# Patient Record
Sex: FEMALE | Race: WHITE | NOT HISPANIC OR LATINO | URBAN - NONMETROPOLITAN AREA
[De-identification: names, ages, dates, MRNs, and addresses within clinical notes are randomized per-mention and may not be internally consistent; named-entity substitution may affect disease eponyms.]

---

## 2018-07-03 ENCOUNTER — OFFICE VISIT CONVERTED (OUTPATIENT)
Dept: FAMILY MEDICINE CLINIC | Age: 18
End: 2018-07-03
Attending: NURSE PRACTITIONER

## 2018-07-07 ENCOUNTER — OFFICE VISIT CONVERTED (OUTPATIENT)
Dept: FAMILY MEDICINE CLINIC | Age: 18
End: 2018-07-07
Attending: NURSE PRACTITIONER

## 2019-06-05 ENCOUNTER — HOSPITAL ENCOUNTER (OUTPATIENT)
Dept: OTHER | Facility: HOSPITAL | Age: 19
Discharge: HOME | End: 2019-06-05
Attending: NURSE PRACTITIONER

## 2019-06-05 ENCOUNTER — OFFICE VISIT CONVERTED (OUTPATIENT)
Dept: FAMILY MEDICINE CLINIC | Age: 19
End: 2019-06-05
Attending: NURSE PRACTITIONER

## 2019-06-06 LAB
HSV1 IGG SER IA-ACNC: <0.91 INDEX (ref 0–0.9)
HSV2 IGG SER IA-ACNC: <0.91 INDEX (ref 0–0.9)

## 2019-07-05 ENCOUNTER — HOSPITAL ENCOUNTER (OUTPATIENT)
Dept: OTHER | Facility: HOSPITAL | Age: 19
Discharge: HOME | End: 2019-07-05

## 2019-07-05 LAB
ALBUMIN SERPL-MCNC: 4.5 G/DL (ref 3.5–5)
ALP SERPL-CCNC: 62 U/L (ref 42–98)
ALT SERPL-CCNC: 8 U/L (ref 10–40)
AST SERPL-CCNC: 16 U/L (ref 15–50)
BILIRUB SERPL-MCNC: 0.33 MG/DL (ref 0.2–1.3)
CONV BILI, CONJUGATED: <0.2 MG/DL (ref 0–0.6)
CONV TOTAL PROTEIN: 7.2 G/DL (ref 6.3–8.2)
CONV UNCONJUGATED BILIRUBIN: 0.1 MG/DL (ref 0–1.1)

## 2019-07-10 LAB
CONV QUANTIFERON TB GOLD: NEGATIVE
QUANTIFERON CRITERIA: NORMAL
QUANTIFERON MITOGEN VALUE: >10 IU/ML
QUANTIFERON NIL VALUE: 0.09 IU/ML
QUANTIFERON TB1 AG VALUE: 0.14 IU/ML
QUANTIFERON TB2 AG VALUE: 0.08 IU/ML

## 2019-07-22 ENCOUNTER — OFFICE VISIT CONVERTED (OUTPATIENT)
Dept: FAMILY MEDICINE CLINIC | Age: 19
End: 2019-07-22
Attending: NURSE PRACTITIONER

## 2019-07-22 ENCOUNTER — HOSPITAL ENCOUNTER (OUTPATIENT)
Dept: OTHER | Facility: HOSPITAL | Age: 19
Discharge: HOME | End: 2019-07-22

## 2019-07-22 LAB
C TRACH RRNA CVX QL NAA+PROBE: NOT DETECTED
CONV HIV-1/ HIV-2: NEGATIVE
N GONORRHOEA DNA SPEC QL NAA+PROBE: NOT DETECTED
TRICHOMONAS, POC: NORMAL

## 2019-07-24 LAB
BACTERIA SPEC AEROBE CULT: NORMAL
CONV HEPATITIS B SURFACE AG W CONFIRMATION RE: NEGATIVE
HAV IGM SERPL QL IA: NEGATIVE
HBV CORE IGM SERPL QL IA: NEGATIVE
HCV AB SER DONR QL: <0.1 S/CO RATIO (ref 0–0.9)
HSV1 IGG SER IA-ACNC: <0.91 INDEX (ref 0–0.9)
HSV2 IGG SER IA-ACNC: <0.91 INDEX (ref 0–0.9)

## 2019-07-25 LAB — CONV TREPONEMA PALLIDUM (RPR) WITH FTA-ABS, TP-PA REFLEXES: NON REACTIVE

## 2019-07-30 ENCOUNTER — HOSPITAL ENCOUNTER (OUTPATIENT)
Dept: OTHER | Facility: HOSPITAL | Age: 19
Discharge: HOME | End: 2019-07-30

## 2019-07-30 ENCOUNTER — OFFICE VISIT CONVERTED (OUTPATIENT)
Dept: FAMILY MEDICINE CLINIC | Age: 19
End: 2019-07-30
Attending: NURSE PRACTITIONER

## 2019-08-01 ENCOUNTER — OFFICE VISIT CONVERTED (OUTPATIENT)
Dept: FAMILY MEDICINE CLINIC | Age: 19
End: 2019-08-01
Attending: NURSE PRACTITIONER

## 2019-08-01 ENCOUNTER — HOSPITAL ENCOUNTER (OUTPATIENT)
Dept: OTHER | Facility: HOSPITAL | Age: 19
Discharge: HOME | End: 2019-08-01

## 2019-08-01 LAB — BACTERIA SPEC AEROBE CULT: NORMAL

## 2019-08-06 LAB
CONV LAST MENSTURAL PERIOD: 2014
SPECIMEN SOURCE: NORMAL
SPECIMEN SOURCE: NORMAL
THIN PREP CVX: NORMAL

## 2019-08-15 ENCOUNTER — HOSPITAL ENCOUNTER (OUTPATIENT)
Dept: OTHER | Facility: HOSPITAL | Age: 19
Discharge: HOME | End: 2019-08-15

## 2019-08-15 LAB
FSH SERPL-ACNC: 5.7 M[IU]/ML
LH SERPL-ACNC: 11.7 M[IU]/ML

## 2019-08-16 LAB
DHEA-S SERPL-MCNC: 200.7 UG/DL (ref 110–431.7)
PROGEST SERPL-MCNC: 0.4 NG/ML

## 2019-08-17 LAB — CONV ESTROGENS, TOTAL, SERUM: 103 PG/ML

## 2019-08-22 LAB
CONV TESTOSTERONE, FREE: 2.3 PG/ML
TESTOST FREE MFR SERPL: 0.6 %
TESTOST SERPL-MCNC: 38 NG/DL

## 2021-01-07 ENCOUNTER — OFFICE VISIT CONVERTED (OUTPATIENT)
Dept: FAMILY MEDICINE CLINIC | Age: 21
End: 2021-01-07
Attending: NURSE PRACTITIONER

## 2021-04-30 ENCOUNTER — OFFICE VISIT CONVERTED (OUTPATIENT)
Dept: FAMILY MEDICINE CLINIC | Age: 21
End: 2021-04-30
Attending: NURSE PRACTITIONER

## 2021-05-18 NOTE — PROGRESS NOTES
Brittany Vasquez  2000     Office/Outpatient Visit    Visit Date: Fri, Apr 30, 2021 09:17 am    Provider: Kelly Underwood N.P. (Assistant: Sabrina Riojas MA)    Location: [facility]        Electronically signed by Kelly Underwood N.P. on  04/30/2021 10:18:41 AM                             Subjective:        CC: Ms. Vasquez is a 21 year old White female.  PT is present in the office today due to due to anxiety: trouble falling asleep/staying asleep, heavy weight on chest, severe questioning everything, contant worrying, irritable. PT also wants to discuss exessive hair growth.;         HPI:           PHQ-9 Depression Screening: Completed form scanned and in chart; Total Score 11           Dysthymic disorder details; she has suggestive symptoms but does not currently carry an official diagnosis of anxiety disorder.  Her symptom complex includes apprehension, feeling of impending doom, increased perspiration, mind racing, difficulty sleeping, irritability, and feeling down.  Apparent triggers include will be graduating school soon, relationship issues, and covid pandemic.  She is not currently being treated for anxiety.      ROS:     CONSTITUTIONAL:  Negative for chills and fever.      CARDIOVASCULAR:  Negative for chest pain and palpitations.      RESPIRATORY:  Negative for dyspnea and frequent wheezing.      NEUROLOGICAL:  Negative for dizziness and headaches.      ENDOCRINE:  Positive for hirsutism and excessive sweating.      PSYCHIATRIC:  Positive for anxiety and sleep disturbance.   Negative for suicidal thoughts.          Past Medical History / Family History / Social History:         Last Reviewed on 4/30/2021 09:33 AM by Kelly Underwood    Past Medical History:                 PAST MEDICAL HISTORY         scoliosis/went to [facility] - no longer seeing         GYNECOLOGICAL HISTORY:    G0    Menarche occurred at age 10.          Surgical History:         Positive  for bunionectomy x 2 , and ;         Family History:     Father: Healthy     Mother: Hypertension; Hyperlipidemia;  Skin Cancer     Paternal Grandfather:  at age 55; Cause of death was lung related     Maternal Grandfather:  at age 75; Cause of death was MI/CVA;  Heart disease;  Skin Cancer     Maternal Grandmother: Arrhythmia ( pacemaker ); Coronary Artery Disease;  Skin Cancer;  cerebral aneurysm         Social History:     Occupation: Student. [employer];     Marital Status:      Children: None         Tobacco/Alcohol/Supplements:     Last Reviewed on 2021 09:22 AM by Sabrina Riojas    Tobacco: She has never smoked.          Alcohol: Frequency: Socially         Substance Abuse History:     Last Reviewed on 2019 03:28 PM by Cindy De Paz         Mental Health History:     Last Reviewed on 2019 03:28 PM by Cindy De Paz        Communicable Diseases (eg STDs):     Last Reviewed on 2019 03:28 PM by Cindy De Paz        Current Problems:     Last Reviewed on 2019 03:28 PM by Cindy De Paz    Chondromalacia patellae    Acquired hallux valgus    Exercise induced bronchospasm    Acne    Scoliosis    Encounter for screening for respiratory tuberculosis    High risk heterosexual behavior    Nonspecific reaction to tuberculin skin test without active tuberculosis    Hypertrichosis, unspecified    Left lower quadrant pain    Encounter for screening for depression        Immunizations:     influenza, injectable, quadrivalent 10/29/2020    DTaP 1998    DTaP 1998    DTaP 1999    DTaP 1998    DTaP 2002    Hib HbOC (4-dose) 1998    Hib HbOC (4-dose) 1999    Comvax (Hib-HepB) 1998    Comvax (Hib-HepB) 1998    Hep B (pedi/adol, 3-dose schedule) 1998    Menveo 4/15/2014    Menactra 2009    zzGardasil 2009    zzGardasil 2009    zzGardasil 2010    Hep A, adult dose 5/3/2018     OPV  Poliovirus, live (oral) 1998    OPV  Poliovirus, live (oral) 1998    OPV  Poliovirus, live (oral) 1/28/2002    IPV  Poliovirus, inactivated 1998    MMR  (Measles-Mumps-Rubella), live 2/8/2002    MMRV 3/8/1999    Varicella, live 3/8/1999    Varivax (Varicella) 6/20/2013    Prevnar (Pneumococcal PCV 7) 4/3/2000    Influenza Virus Vaccine, unspecified formulation 10/1/2018    PPD 7/3/2018    PPD 7/6/2018    Adacel (Tdap) 7/3/2018    Tdap (Tetanus, reduced diph, acellular pertussis) 7/14/2009    SARS-COV-2 (COVID-19) vaccine, UNSPECIFIED 4/8/2021        Allergies:     Last Reviewed on 4/30/2021 09:21 AM by Sabrina Riojas    No Known Allergies.        Current Medications:     Last Reviewed on 4/30/2021 09:21 AM by Sabrina Riojas    None        Objective:        Vitals:         Current: 4/30/2021 9:27:59 AM    Ht:  5 ft, 4.5 in;  Wt: 126.4 lbs;  BMI: 21.4T: 97.8 F (temporal);  BP: 116/79 mm Hg (left arm, sitting);  P: 77 bpm (left arm (BP Cuff), sitting)        Exams:     PHYSICAL EXAM:     GENERAL: well developed, well nourished;  no apparent distress;     RESPIRATORY: normal respiratory rate and pattern with no distress; normal breath sounds with no rales, rhonchi, wheezes or rubs;     CARDIOVASCULAR: normal rate; rhythm is regular;     NEUROLOGIC: mental status: alert and oriented x 3; GROSSLY INTACT     PSYCHIATRIC: appropriate affect and demeanor; normal speech pattern; normal thought and perception;         Assessment:         F34.1   Dysthymic disorder       Z13.31   Encounter for screening for depression       L68.9   Hypertrichosis, unspecified           ORDERS:         Meds Prescribed:       [New Rx] Zoloft 25 mg oral tablet [take 1 tablet (25 mg) by oral route once daily], #30 (thirty) tablets, Refills: 1 (one)       [New Rx] hydrOXYzine HCL 25 mg oral tablet [take 1 to 2 tablets every 6 hours as needed for anxiety or insomnia], #60 (sixty) tablets, Refills: 0 (zero)         Lab  Orders:       APPTO  Appointment need  (In-House)              Other Orders:         Depression screen positive and follow up plan documented  (In-House)                      Plan:         Dysthymic disorderWill start low dose Zoloft and Hydroxyzine as needed. Potential side effects discussed. F/U 6 weeks.     MIPS Vaccines Flu and Pneumonia updated in Shot record     FOLLOW-UP: Schedule a follow-up appointment in 6 weeks.:for Annual Checkup           Prescriptions:       [New Rx] Zoloft 25 mg oral tablet [take 1 tablet (25 mg) by oral route once daily], #30 (thirty) tablets, Refills: 1 (one)       [New Rx] hydrOXYzine HCL 25 mg oral tablet [take 1 to 2 tablets every 6 hours as needed for anxiety or insomnia], #60 (sixty) tablets, Refills: 0 (zero)           Orders:       APPTO  Appointment need  (In-House)              Encounter for screening for depression    MIPS PHQ-9 Depression Screening: Completed form scanned and in chart; Total Score 11 Positive Depression Screen: Suicide Risk Assessment completed--denies suicidal/homicidal ideation           Orders:         Depression screen positive and follow up plan documented  (In-House)              Hypertrichosis, unspecifiedPlan for labs at next visit. She did have labs completed for same in 2019 that were normal            Patient Recommendations:        For  Dysthymic disorder:    Schedule a follow-up visit in 6 weeks.              Charge Capture:         Primary Diagnosis:     F34.1  Dysthymic disorder           Orders:      22168  Office/outpatient visit; established patient, level 4  (In-House)            APPTO  Appointment need  (In-House)              Z13.31  Encounter for screening for depression           Orders:        Depression screen positive and follow up plan documented  (In-House)              L68.9  Hypertrichosis, unspecified

## 2021-05-18 NOTE — PROGRESS NOTES
Brittany Vasquez 2000     Office/Outpatient Visit    Visit Date:  08:47 am    Provider: Cindy De Paz N.P. (Assistant: Celia Jordan MA)    Location: [facility]        Electronically signed by Cindy De Paz N.P. on  2018 10:29:50 AM                             SUBJECTIVE:        PMH/FMH/SH:     Last Reviewed on 2018 03:17 PM by Fide Lopez    Past Medical History:                 PAST MEDICAL HISTORY     UNREMARKABLE     scoliosis/went to [facility]         GYNECOLOGICAL HISTORY:    G0    No problems with menstrual cycles.    Current method of contraception is nexplanon;     Menarche occurred at age 10. Sexually Active? No;         Surgical History:         Positive for bunionectomy x 2 , and ;         Family History:     Father: Healthy     Mother: Hypertension; Hyperlipidemia;  Skin Cancer     Brother(s): Healthy; 1 brother(s) total     Sister(s): Healthy; 1 sister(s) total     Paternal Grandfather:  at age 55; Cause of death was lung related     Paternal Grandmother: Medical history unknown     Maternal Grandfather:  at age 75; Cause of death was MI/CVA;  Skin Cancer     Maternal Grandmother: Arrhythmia ( pacemaker ); Coronary Artery Disease;  Skin Cancer;  cerebral aneurysm         Social History:     Parents' Marital Status:      Currently attends College. 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          Tobacco/Alcohol/Supplements:     Last Reviewed on 2018 12:25 PM by Nan Abraham    Tobacco: She has never smoked.          Alcohol: Frequency:    weekly to monthly;         Substance Abuse History:     Last Reviewed on 2018 03:21 PM by Fide Lopez    None             Current Problems:     Last Reviewed on 2014 03:15 PM by Lor Rivera    High-risk sexual behavior     Scoliosis     Exercise induced bronchospasm     Acne      Acquired hallux valgus     Chondromalacia patellae     Rash     Atypical skin lesion     Screening for pulmonary tuberculosis         Immunizations:     DTaP 1998     DTaP 1998     DTaP 6/24/1999     DTaP 1998     DTaP 1/28/2002     Hib HbOC (4-dose) 1998     Hib HbOC (4-dose) 6/24/1999     Comvax (Hib-HepB) 1998     Comvax (Hib-HepB) 1998     Hep B (pedi/adol, 3-dose schedule) 1998     Menveo 4/15/2014     Menactra 7/14/2009     zzGardasil 7/14/2009     zzGardasil 9/14/2009     zzGardasil 2/5/2010     Hep A, adult dose 5/3/2018     OPV  Poliovirus, live (oral) 1998     OPV  Poliovirus, live (oral) 1998     OPV  Poliovirus, live (oral) 1/28/2002     IPV  Poliovirus, inactivated 1998     MMR  (Measles-Mumps-Rubella), live 2/8/2002     MMRV 3/8/1999     Varicella, live 3/8/1999     Varivax (Varicella) 6/20/2013     Prevnar (Pneumococcal PCV 7) 4/3/2000     PPD 7/3/2018     PPD 7/6/2018     Adacel (Tdap) 7/3/2018     Tdap (Tetanus, reduced diph, acellular pertussis) 7/14/2009         Allergies:     Last Reviewed on 7/07/2018 12:25 PM by Nan Abraham      No Known Drug Allergies.         Current Medications:     Last Reviewed on 7/07/2018 12:27 PM by Nan Abraham    Nexplanon 68mg Implant implanted by gyn     Bactrim DS 160mg/800mg Tablet Take 1 tablet(s) by mouth q12h for 10 days     Medrol 4mg Dosepak Take as directed with food         ASSESSMENT           795.51   R76.11  Nonspecific reaction to tuberculin skin test without active tuberculosis              DDx:         ORDERS:         Radiology/Test Orders:       [id number]  Radiologic exam chest 2 views  (Send-Out)           Lab Orders:       [id number]  [facility] QuantiFeron TB Gold (Tuberculosis cell gamma)  (Send-Out)                   PLAN:          Nonspecific reaction to tuberculin skin test without active tuberculosis     LABORATORY:  Labs ordered to be performed today include Quantiferon TB Gold.      RADIOLOGY:  I have  ordered a chest x-ray (PA and lateral) to be done today.            Orders:       [id number]  [facility] QuantiFeron TB Gold (Tuberculosis cell gamma)  (Send-Out)         [id number]  Radiologic exam chest 2 views  (Send-Out)               CHARGE CAPTURE           **Please note: ICD descriptions below are intended for billing purposes only and may not represent clinical diagnoses**        Primary Diagnosis:         795.51 Nonspecific reaction to tuberculin skin test without active tuberculosis            R76.11    Nonspecific reaction to tuberculin skin test without active tuberculosis

## 2021-05-18 NOTE — PROGRESS NOTES
Brittany Vasquez 2000     Office/Outpatient Visit    Visit Date: Thu, Aug 1, 2019 03:19 pm    Provider: Cindy De Paz N.P. (Assistant: Cheri Norman MA)    Location: [facility]        Electronically signed by Cindy De Paz N.P. on  08/05/2019 09:20:12 AM                             SUBJECTIVE:        CC:     Ms. Vasquez is a 19 year old White female.  She is here for an annual exam.          HPI:         Well Woman Exam noted.  She cannot recall when she last had a physical exam.  Her last menstrual period was 2014.  Her current method of contraception is a contraceptive implant.  She does not perform breast self-exams.  Ms. Vasquez denies any history of abnormal Pap smears.  Tobacco: She has never smoked.              PHQ-9 Depression Screening: Completed form scanned and in chart; Total Score 7 Alcohol Consumption Screening: Completed form scanned and in chart; Total Score 3         Pelvic pain: Pt states difficulty with sex.  She had a boyfriend for a few years and could never get comfortable with sex despite trying many times. States she would just get thru it.      ROS:     CONSTITUTIONAL:  Negative for chills, fatigue, fever, and weight change.      EYES:  Negative for blurred vision, eye pain, and photophobia.      E/N/T:  Negative for hearing problems, E/N/T pain, congestion, rhinorrhea, epistaxis, hoarseness, and dental problems.      CARDIOVASCULAR:  Negative for chest pain, palpitations, tachycardia, orthopnea, and edema.      RESPIRATORY:  Negative for cough, dyspnea, and hemoptysis.      GASTROINTESTINAL:  Negative for abdominal pain, heartburn, constipation, diarrhea, and stool changes.      GENITOURINARY:  Negative for genital lesions, hematuria, menstrual problems, polyuria, abnormal vaginal bleeding, and vaginal discharge.      MUSCULOSKELETAL:  Negative for arthralgias, back pain, and myalgias.      INTEGUMENTARY/BREAST:  Negative for atypical moles, dry skin,  pruritis, rashes, breast masses, and nipple discharge.      NEUROLOGICAL:  Negative for dizziness, headaches, paresthesias, and weakness.      HEMATOLOGIC/LYMPHATIC:  Negative for easy bruising, bleeding, and lymphadenopathy.      ENDOCRINE:  Negative for hair loss, heat/cold intolerance, polydipsia, and polyphagia.      ALLERGIC/IMMUNOLOGIC:  Negative for allergies, frequent illnesses, HIV exposure, and urticaria.      PSYCHIATRIC:  Negative for anxiety, depression, and sleep disturbances.          PMH/FMH/SH:     Last Reviewed on 2019 03:28 PM by Cindy De Paz    Past Medical History:                 PAST MEDICAL HISTORY     UNREMARKABLE     scoliosis/went to [facility]         GYNECOLOGICAL HISTORY:    G0    No problems with menstrual cycles.    Current method of contraception is nexplanon;     Menarche occurred at age 10. Sexually Active? No;         Surgical History:         Positive for bunionectomy x 2 , and ;         Family History:     Father: Healthy     Mother: Hypertension; Hyperlipidemia;  Skin Cancer     Brother(s): Healthy; 1 brother(s) total     Sister(s): Healthy; 1 sister(s) total     Paternal Grandfather:  at age 55; Cause of death was lung related     Paternal Grandmother: Medical history unknown     Maternal Grandfather:  at age 75; Cause of death was MI/CVA;  Skin Cancer     Maternal Grandmother: Arrhythmia ( pacemaker ); Coronary Artery Disease;  Skin Cancer;  cerebral aneurysm         Social History:     Parents' Marital Status:      Currently attends College. ( [employer];  [employer]; field of study is radiology )     Employment: Works at [employer].          Tobacco/Alcohol/Supplements:     Last Reviewed on 2019 03:28 PM by Cindy De Paz    Tobacco: She has never smoked.          Alcohol: Frequency:    weekly to monthly;         Substance Abuse History:     Last Reviewed on 2019 03:28 PM by Cindy De Paz    None          Mental Health History:     Last Reviewed on 7/30/2019 03:28 PM by Cindy De Paz        Communicable Diseases (eg STDs):     Last Reviewed on 7/30/2019 03:28 PM by Cindy De Paz            Current Problems:     Last Reviewed on 7/30/2019 03:28 PM by Cindy De Paz    High-risk sexual behavior     Nonspecific reaction to tuberculin skin test without active tuberculosis     Scoliosis     Exercise induced bronchospasm     Acne     Acquired hallux valgus     Chondromalacia patellae     Vaginal discharge     URI     Anal pruritus     Screening for pulmonary tuberculosis         Immunizations:     DTaP 1998     DTaP 1998     DTaP 6/24/1999     DTaP 1998     DTaP 1/28/2002     Hib HbOC (4-dose) 1998     Hib HbOC (4-dose) 6/24/1999     Comvax (Hib-HepB) 1998     Comvax (Hib-HepB) 1998     Hep B (pedi/adol, 3-dose schedule) 1998     Menveo 4/15/2014     Menactra 7/14/2009     zzGardasil 7/14/2009     zzGardasil 9/14/2009     zzGardasil 2/5/2010     Hep A, adult dose 5/3/2018     OPV  Poliovirus, live (oral) 1998     OPV  Poliovirus, live (oral) 1998     OPV  Poliovirus, live (oral) 1/28/2002     IPV  Poliovirus, inactivated 1998     MMR  (Measles-Mumps-Rubella), live 2/8/2002     MMRV 3/8/1999     Varicella, live 3/8/1999     Varivax (Varicella) 6/20/2013     Prevnar (Pneumococcal PCV 7) 4/3/2000     Influenza Virus Vaccine, unspecified formulation 10/1/2018     PPD 7/3/2018     PPD 7/6/2018     Adacel (Tdap) 7/3/2018     Tdap (Tetanus, reduced diph, acellular pertussis) 7/14/2009         Allergies:     Last Reviewed on 7/30/2019 03:28 PM by Cindy De Paz      No Known Drug Allergies.         Current Medications:     Last Reviewed on 7/30/2019 03:28 PM by Cindy De Paz    Nexplanon 68mg Implant implanted by gyn     Terbinafine HCl 250mg Tablet Take 1 tablet(s) by mouth daily         OBJECTIVE:        Vitals:         Current: 8/1/2019 3:24:41 PM    Ht:  5 ft,  4.5 in;  Wt: 123.8 lbs;  BMI: 20.9    T: 98.1 F (oral);  BP: 130/75 mm Hg (right arm, sitting);  P: 86 bpm (right arm (BP Cuff), sitting)        Exams:     PHYSICAL EXAM:     GENERAL: vital signs recorded - well developed, well nourished;  no apparent distress;     EYES: extraocular movements intact; conjunctiva and cornea are normal; PERRLA;     E/N/T:  normal EACs, TMs, nasal/oral mucosa, teeth, gingiva, and oropharynx;     NECK: range of motion is normal; thyroid is non-palpable;     RESPIRATORY: normal respiratory rate and pattern with no distress; normal breath sounds with no rales, rhonchi, wheezes or rubs;     CARDIOVASCULAR: normal rate; rhythm is regular;  no systolic murmur; no edema;     GASTROINTESTINAL: nontender; normal bowel sounds; no organomegaly;     GENITOURINARY: Pap smear taken;  vagina: normal with good pelvic support and no lesions or discharge;;  cervix: normal appearance without lesions or discharge;;  adnexa: normal with no masses or tenderness     LYMPHATIC: no enlargement of cervical or facial nodes; no supraclavicular nodes;     BREAST/INTEGUMENT: BREASTS: breast exam is normal without masses, skin changes, or nipple discharge; SKIN: no significant rashes or lesions; no suspicious moles;     MUSCULOSKELETAL:  Normal range of motion, strength and tone;     NEUROLOGICAL:  cranial nerves, motor and sensory function, reflexes, gait and coordination are all intact;     PSYCHIATRIC:  appropriate affect and demeanor; normal speech pattern; grossly normal memory;         ASSESSMENT:           V72.31     Routine gynecological examination     V79.0   Z13.31  Screening for depression              DDx:     625.9   R10.2  Pelvic pain, female              DDx:         ORDERS:         Radiology/Test Orders:       [id number]  Ultrasound, transvaginal  (Send-Out)           Procedures Ordered:       [id number]  Handlg&/or convey of spec for TR office to lab  (In-House)                   PLAN:          Routine  gynecological examination         LABORATORY:  Lab studies ordered today include Pap smear.      COUNSELING was provided today regarding the following topics: healthy eating habits and breast self-exam.      FOLLOW-UP: Schedule a follow-up visit in 12 months.            Orders:      [id number]  Cytopathology, slides, cervical or vaginal; manual screening under MD supervision  (Send-Out)        [id number]  Handlg&/or convey of spec for TR office to lab  (In-House)            Pelvic pain, female         FOLLOW-UP TESTING #1:    RADIOLOGY:  I have ordered Pelvic Ultrasound Transvaginal to be done today.            Orders:       [id number]  Ultrasound, transvaginal  (Send-Out)               Patient Recommendations:        For  Routine gynecological examination:         Limit dietary intake of fat (especially saturated fat) and cholesterol.  Eat a variety of foods, including plenty of fruits, vegetables, and grain containg fiber, limit fat intake to 30% of total calories. Balance caloric intake with energy expended.    You should regularly examine your breasts, easily done while in the shower or with lotion.  Feel and look for differences in consistency from month to month, especially noting knots or lumps, changes in skin appearance, nipple retraction or discharge.  Schedule a follow-up visit in 12 months.              CHARGE CAPTURE:           Primary Diagnosis:     V72.31    Routine gynecological examination                Z01.419    Encounter for gynecological examination (general) (routine) without abnormal findings                       Orders:          [id number]   Preventive medicine, established patient, age 18-39 years  (In-House)             [id number]   Handlg&/or convey of spec for TR office to lab  (In-House)           V79.0 Screening for depression            Z13.31    Encounter for screening for depression    625.9 Pelvic pain, female            R10.2    Pelvic and perineal pain

## 2021-05-18 NOTE — PROGRESS NOTES
Brittany Vasquez  2000     Office/Outpatient Visit    Visit Date: Thu, Jan 7, 2021 11:19 am    Provider: Kelly Underwood N.P. (Assistant: Fide Lucia LPN)    Location: [facility]        Electronically signed by Kelly Underwood N.P. on  01/09/2021 04:34:10 PM                             Subjective:        CC: Ms. Vasquez is a 20 year old White female.  Groin issues x 8 years;         HPI: Brittany is here today for evaluation of 'left groin issues' that started in 2012. 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 Started with pain after that that lasted for at least one month. She stopped cheerleading for about 3 months and quit she started back up the pain restarted. She ended up having to quit cheerleading due to symptoms. Symptoms interrmittent but really flared back up in 2018 when working out and again heard a pop. She stopped exercise for 4-5 months that time. She has stopped doing squats. Has now modified exercise and when stop as soon as she feels pain. Has wrapped, iced, elevated area. Never has redness. Only mild swelling. Pain does not radiate to hip. Has had no further testing for symptoms. Reports hx scoliosis, forward pelvis diagnosed at age 14. Saw [facility] who followed but no intervention. Did PT for scoliosis and bunions in past.    ROS:     CONSTITUTIONAL:  Negative for chills and fever.      CARDIOVASCULAR:  Negative for chest pain and palpitations.      RESPIRATORY:  Negative for dyspnea and frequent wheezing.      GASTROINTESTINAL:  Positive for left groin pain.   Negative for vomiting.      GENITOURINARY:  Negative for dysuria and frequent urination.      NEUROLOGICAL:  Negative for dizziness and headaches.          Past Medical History / Family History / Social History:         Last Reviewed on 1/07/2021 11:34 AM by Kelly Underwood    Past Medical History:                 PAST MEDICAL HISTORY         scoliosis/went to [facility] - no longer seeing         GYNECOLOGICAL HISTORY:    G0    Menarche occurred at age 10.          Surgical History:         Positive for bunionectomy x 2 , and ;         Family History:     Father: Healthy     Mother: Hypertension; Hyperlipidemia;  Skin Cancer     Paternal Grandfather:  at age 55; Cause of death was lung related     Maternal Grandfather:  at age 75; Cause of death was MI/CVA;  Heart disease;  Skin Cancer     Maternal Grandmother: Arrhythmia ( pacemaker ); Coronary Artery Disease;  Skin Cancer;  cerebral aneurysm         Social History:     Occupation: Student. [employer];     Marital Status:      Children: None         Tobacco/Alcohol/Supplements:     Last Reviewed on 2021 11:21 AM by Fide Lucia    Tobacco: She has never smoked.          Alcohol: Frequency: Socially         Substance Abuse History:     Last Reviewed on 2019 03:28 PM by Cindy De Paz         Mental Health History:     Last Reviewed on 2019 03:28 PM by Cindy De Paz        Communicable Diseases (eg STDs):     Last Reviewed on 2019 03:28 PM by Cindy De Paz        Current Problems:     Last Reviewed on 2019 03:28 PM by Cindy De Paz    Chondromalacia patellae    Acquired hallux valgus    Exercise induced bronchospasm    Acne    Scoliosis    Encounter for screening for respiratory tuberculosis    High risk heterosexual behavior    Nonspecific reaction to tuberculin skin test without active tuberculosis    Hypertrichosis, unspecified        Immunizations:     DTaP 1998    DTaP 1998    DTaP 1999    DTaP 1998    DTaP 2002    Hib HbOC (4-dose) 1998    Hib HbOC (4-dose) 1999    Comvax (Hib-HepB) 1998    Comvax (Hib-HepB) 1998    Hep B (pedi/adol, 3-dose schedule) 1998    Menveo 4/15/2014    Menactra 2009    zzGardasil 2009    zzGardasil 2009    zzGardasil 2010    Hep A, adult  dose 5/3/2018    OPV  Poliovirus, live (oral) 1998    OPV  Poliovirus, live (oral) 1998    OPV  Poliovirus, live (oral) 1/28/2002    IPV  Poliovirus, inactivated 1998    MMR  (Measles-Mumps-Rubella), live 2/8/2002    MMRV 3/8/1999    Varicella, live 3/8/1999    Varivax (Varicella) 6/20/2013    Prevnar (Pneumococcal PCV 7) 4/3/2000    Influenza Virus Vaccine, unspecified formulation 10/1/2018    PPD 7/3/2018    PPD 7/6/2018    Adacel (Tdap) 7/3/2018    Tdap (Tetanus, reduced diph, acellular pertussis) 7/14/2009    influenza, injectable, quadrivalent 10/29/2020        Allergies:     Last Reviewed on 1/07/2021 11:21 AM by Fide Lucia    No Known Allergies.        Current Medications:     Last Reviewed on 1/07/2021 11:22 AM by Fide Lucia    None        Objective:        Vitals:         Historical:     8/1/2019  BP:   130/75 mm Hg ( (right arm, , sitting, );) 8/1/2019  P:   86bpm ( (right arm (BP Cuff), , sitting, );) 7/30/2019  Wt:   123.4lbs    Current: 1/7/2021 11:23:35 AM    Ht:  5 ft, 4.5 inT: 97.3 F (oral);  BP: 116/74 mm Hg (left arm, sitting);  P: 85 bpm (left arm (BP Cuff), sitting)        Exams:     PHYSICAL EXAM:     GENERAL: well developed, well nourished;  no apparent distress;     RESPIRATORY: normal respiratory rate and pattern with no distress; normal breath sounds with no rales, rhonchi, wheezes or rubs;     CARDIOVASCULAR: normal rate; rhythm is regular;     GASTROINTESTINAL: nontender; normal bowel sounds; no organomegaly; no palpable hernias hernia present;     MUSCULOSKELETAL: normal gait; normal range of motion of all major muscle groups; no limb or joint pain with range of motion;     NEUROLOGIC: mental status: alert and oriented x 3; GROSSLY INTACT         Assessment:         R10.32   Left lower quadrant pain           ORDERS:         Radiology/Test Orders:       [id number]  Bilateral groin area  (Send-Out)                      Plan:         Left lower quadrant painNo palpable  abnormalities such as hernias or lympadenopathy. Will evaluate further with US. Possible that this is muscle injury.        RADIOLOGY:  I have ordered Groin Ultrasound to be done today.      RECOMMENDATIONS given include: Further recommendation to be given after test results are complete.      FOLLOW-UP: for after testing           Orders:       [id number]  Bilateral groin area  (Send-Out)                  Charge Capture:         Primary Diagnosis:     R10.32  Left lower quadrant pain           Orders:      [id number]  Office/outpatient visit; established patient, level 3  (In-House)

## 2021-05-18 NOTE — PROGRESS NOTES
Brittany Vasquez 2000     Office/Outpatient Visit    Visit Date: Sat, Jul 7, 2018 12:22 pm    Provider: Cindy De Paz N.P. (Assistant: Nan Abraham LPN)    Location: [facility]        Electronically signed by Cindy De Paz N.P. on  07/09/2018 09:23:02 AM                             SUBJECTIVE:        CC:     Ms. Vasquez is a 18 year old White female.  rash on Rt wrist.;         HPI:         Ms. Vasquze presents with rash.  Pt states rash to R wrist after receiving a TB skin test. States she was told it was placed in the wrong area. It was redone on her L upper arm, higher than the first placement.  States her arm is sore, was bruised and now is some better. States her arm is very sensitive to touch and has felt warm.         Concerning high-risk sexual behavior, pt states wanting to be tested for STD's.  She does not have current symptoms.     ROS:     CONSTITUTIONAL:  Negative for chills, fatigue, fever, and weight change.      EYES:  Negative for blurred vision.      CARDIOVASCULAR:  Negative for chest pain, orthopnea, paroxysmal nocturnal dyspnea and pedal edema.      RESPIRATORY:  Negative for dyspnea.      GASTROINTESTINAL:  Negative for abdominal pain, constipation, diarrhea, nausea and vomiting.      NEUROLOGICAL:  Negative for dizziness, headaches, paresthesias, and weakness.      PSYCHIATRIC:  Negative for anxiety, depression, and sleep disturbances.          PMH/FMH/SH:     Last Reviewed on 7/03/2018 03:17 PM by Fide Lopez    Past Medical History:                 PAST MEDICAL HISTORY     UNREMARKABLE     scoliosis/went to [facility] [place]         GYNECOLOGICAL HISTORY:    G0    No problems with menstrual cycles.    Current method of contraception is nexplanon;     Menarche occurred at age 10. Sexually Active? No;         Surgical History:         Positive for bunionectomy x 2 11/13, and 01/14;         Family History:     Father: Healthy     Mother:  Hypertension; Hyperlipidemia;  Skin Cancer     Brother(s): Healthy; 1 brother(s) total     Sister(s): Healthy; 1 sister(s) total     Paternal Grandfather:  at age 55; Cause of death was lung related     Paternal Grandmother: Medical history unknown     Maternal Grandfather:  at age 75; Cause of death was MI/CVA;  Skin Cancer     Maternal Grandmother: Arrhythmia ( pacemaker ); Coronary Artery Disease;  Skin Cancer;  cerebral aneurysm         Social History:     Parents' Marital Status:      Currently attends College. ( [employer];  [employer]; field of study is radiology )     Employment: Works at [employer].          Tobacco/Alcohol/Supplements:     Last Reviewed on 2018 12:25 PM by Nan Abraham    Tobacco: She has never smoked.          Alcohol: Frequency:    weekly to monthly;         Substance Abuse History:     Last Reviewed on 2018 03:21 PM by Fide Lopez    None             Current Problems:     Last Reviewed on 2014 03:15 PM by Lor Rivera    Scoliosis     Exercise induced bronchospasm     Acne     Acquired hallux valgus     Chondromalacia patellae     Atypical skin lesion     Screening for pulmonary tuberculosis         Immunizations:     DTaP 1998     DTaP 1998     DTaP 1999     DTaP 1998     DTaP 2002     Hib HbOC (4-dose) 1998     Hib HbOC (4-dose) 1999     Comvax (Hib-HepB) 1998     Comvax (Hib-HepB) 1998     Hep B (pedi/adol, 3-dose schedule) 1998     Menveo 4/15/2014     Menactra 2009     zzGardasil 2009     zzGardasil 2009     zzGardasil 2010     Hep A, adult dose 5/3/2018     OPV  Poliovirus, live (oral) 1998     OPV  Poliovirus, live (oral) 1998     OPV  Poliovirus, live (oral) 2002     IPV  Poliovirus, inactivated 1998     MMR  (Measles-Mumps-Rubella), live 2002     MMRV 3/8/1999     Varicella, live 3/8/1999     Varivax (Varicella) 2013     Prevnar  (Pneumococcal PCV 7) 4/3/2000     PPD 7/3/2018     PPD 7/6/2018     Adacel (Tdap) 7/3/2018     Tdap (Tetanus, reduced diph, acellular pertussis) 7/14/2009         Allergies:     Last Reviewed on 7/07/2018 12:25 PM by Nan Abraham      No Known Drug Allergies.         Current Medications:     Last Reviewed on 7/07/2018 12:27 PM by Nan Abraham    Nexplanon 68mg Implant implanted by gyn         OBJECTIVE:        Vitals:         Current: 7/7/2018 12:24:38 PM    Ht:  5 ft, 4.5 in;  Wt: 126.1 lbs;  BMI: 21.3    T: 98.8 F (oral);  BP: 127/82 mm Hg (left arm, sitting);  P: 74 bpm (left arm (BP Cuff), sitting)        Exams:     PHYSICAL EXAM:     GENERAL: vital signs recorded - well developed, well nourished;  no apparent distress;     EYES: extraocular movements intact; conjunctiva and cornea are normal; PERRLA;     NECK: range of motion is normal; thyroid is non-palpable;     RESPIRATORY: normal respiratory rate and pattern with no distress; normal breath sounds with no rales, rhonchi, wheezes or rubs;     CARDIOVASCULAR: normal rate; rhythm is regular;  no systolic murmur; no edema;     BREAST/INTEGUMENT: R forearm and L forearm with approx 0.5cm area of erythema and tenderness, no elevation.;     NEUROLOGICAL:  cranial nerves, motor and sensory function, reflexes, gait and coordination are all intact;     PSYCHIATRIC:  appropriate affect and demeanor; normal speech pattern; grossly normal memory;         ASSESSMENT:           782.1   R21  Rash              DDx:     V69.2   Z72.51  High-risk sexual behavior              DDx:         ORDERS:         Meds Prescribed:       Medrol (Methylprednisolone) 4mg Dosepak Take as directed with food  #1 (One) dose pack Refills: 0       Bactrim DS (Trimethoprim/Sulfamethoxazole ) Tablet Take 1 tablet(s) by mouth q12h for 10 days  #20 (Twenty) tablet(s) Refills: 0         Lab Orders:       [id number]  [facility] Hepatitis Panel (HAAb, HbcAB, HbsAG, Hep C antibody)  (Send-Out)         18137  HIV  - [facility] HIV-1 and HIV-2 Ab  (Send-Out)         75483  RPR - [facility] RPR, Rfx Qn RPR/Confirm TP  (Send-Out)         77867  CTNGX- [facility] Chlamydia GC swab or urine (67273, 53596)  (Send-Out)         88136  HSVIG -[facility]- Herpes simplex IgG1&@ 97533 and 81606  (Send-Out)         55042  BDUTR - H Urine Trichonomas  (Send-Out)                   PLAN:          Rash           Prescriptions:       Medrol (Methylprednisolone) 4mg Dosepak Take as directed with food  #1 (One) dose pack Refills: 0       Bactrim DS (Trimethoprim/Sulfamethoxazole ) Tablet Take 1 tablet(s) by mouth q12h for 10 days  #20 (Twenty) tablet(s) Refills: 0          High-risk sexual behavior     LABORATORY:  Labs ordered to be performed today include STD Testing: Hepatitis panel HIV I and HIV 2 Ab RPR Chlamydia/GC [facility] Herpes type I and II IgG index value on each type Urine Trichomonas.            Orders:       [id number]  AHEP4 - [facility] Hepatitis Panel (HAAb, HbcAB, HbsAG, Hep C antibody)  (Send-Out)         03604  HIV - [facility] HIV-1 and HIV-2 Ab  (Send-Out)         66886  RPR - [facility] RPR, Rfx Qn RPR/Confirm TP  (Send-Out)         77532  CTNGX- [facility] Chlamydia GC swab or urine (71697, 71971)  (Send-Out)         98824  HSVIG -[facility]- Herpes simplex IgG1&@ 69068 and 45211  (Send-Out)         78744  BDUTR - [facility] Urine Trichonomas  (Send-Out)               CHARGE CAPTURE:           Primary Diagnosis:     782.1 Rash            R21    Rash and other nonspecific skin eruption              Orders:          [id number]   Office/outpatient visit; established patient, level 3  (In-House)           V69.2 High-risk sexual behavior            Z72.51    High risk heterosexual behavior

## 2021-05-18 NOTE — PROGRESS NOTES
"Brittany Vasquez 2000     Office/Outpatient Visit    Visit Date: Tue, Jul 3, 2018 03:06 pm    Provider: Fide Lopez N.P. (Assistant: Lilly Walls MA)    Location: [facility]        Electronically signed by Fide Lopez N.P. on  07/03/2018 04:58:01 PM                             SUBJECTIVE:        CC:     Ms. Vasquez is a 18 year old White female.  This is her first visit to the clinic.  yearly check up and needing shots (PHQ9 Score-6 Alcohol Score-2); nexplanon          HPI:         HEALTH RISK PROFILE (\"At Risk\" items are starred):     Smoking: Life-long non-smoker;     Immunization Status: last tdap 7-2009;     Nutrition: follows a healthy diet;     Physical Activity: 3-4 times per week on average;     Alcohol/Drug Use: socially; No illicit drug use;     Safety: Always wears seatbelt;     ROS:     CONSTITUTIONAL:  Positive for fatigue.   Negative for chills or fever.      EYES:  Negative for blurred vision.      E/N/T:  Negative for diminished hearing and nasal congestion.      CARDIOVASCULAR:  Negative for chest pain and palpitations.      RESPIRATORY:  Positive for has sports induced asthma.   Negative for recent cough or dyspnea.      GASTROINTESTINAL:  Negative for abdominal pain, nausea and vomiting.      MUSCULOSKELETAL:  Negative for arthralgias and myalgias.      INTEGUMENTARY/BREAST:  Positive for several skin moles, one changed on her right side of her abdomen.   Negative for rash.      NEUROLOGICAL:  Negative for paresthesias and weakness.      PSYCHIATRIC:  Positive for feelings of stress ( (related to her dad and their poor relationship) ) and insomnia.  she has always struggled with sleep, looking forward to moving to school and avoiding family drama         PMH/FMH/SH:     Last Reviewed on 7/03/2018 03:17 PM by Fide Lopez    Past Medical History:                 PAST MEDICAL HISTORY     UNREMARKABLE     scoliosis/went to [facility] spine [place]     "     GYNECOLOGICAL HISTORY:    G0    No problems with menstrual cycles.    Current method of contraception is nexplanon;     Menarche occurred at age 10. Sexually Active? No;         Surgical History:         Positive for bunionectomy x 2 , and ;         Family History:     Father: Healthy     Mother: Hypertension; Hyperlipidemia;  Skin Cancer     Brother(s): Healthy; 1 brother(s) total     Sister(s): Healthy; 1 sister(s) total     Paternal Grandfather:  at age 55; Cause of death was lung related     Paternal Grandmother: Medical history unknown     Maternal Grandfather:  at age 75; Cause of death was MI/CVA;  Skin Cancer     Maternal Grandmother: Arrhythmia ( pacemaker ); Coronary Artery Disease;  Skin Cancer;  cerebral aneurysm         Social History:     Parents' Marital Status:      Currently attends College. ( [employer];  [employer]; field of study is radiology )     Employment: Works at [employer]ys[employer].          Tobacco/Alcohol/Supplements:     Last Reviewed on 2018 04:51 PM by Fide Lopez    Tobacco: She has never smoked.          Alcohol: Frequency:    weekly to monthly;         Substance Abuse History:     Last Reviewed on 2018 03:21 PM by Fide Lopez    None             Immunizations:     DTaP 1998     DTaP 1998     DTaP 1999     DTaP 1998     DTaP 2002     Hib HbOC (4-dose) 1998     Hib HbOC (4-dose) 1999     Comvax (Hib-HepB) 1998     Comvax (Hib-HepB) 1998     Hep B (pedi/adol, 3-dose schedule) 1998     Menveo 4/15/2014     Menactra 2009     zzGardasil 2009     zzGardasil 2009     zzGardasil 2010     OPV  Poliovirus, live (oral) 1998     OPV  Poliovirus, live (oral) 1998     OPV  Poliovirus, live (oral) 2002     IPV  Poliovirus, inactivated 1998     MMR  (Measles-Mumps-Rubella), live 2002     MMRV 3/8/1999     Varivax (Varicella) 2013     Prevnar (Pneumococcal  PCV 7) 4/3/2000     Tdap (Tetanus, reduced diph, acellular pertussis) 7/14/2009         Allergies:     Last Reviewed on 7/03/2018 03:10 PM by Lilly Walls      No Known Drug Allergies.         Current Medications:     Last Reviewed on 7/03/2018 03:34 PM by Fide Lopez    Nexplanon 68mg Implant implanted by gyn         OBJECTIVE:        Vitals:         Current: 7/3/2018 3:08:49 PM    Ht:  5 ft, 4.5 in;  Wt: 123 lbs;  BMI: 20.8    T: 98.2 F (oral);  BP: 122/75 mm Hg (left arm, sitting);  P: 68 bpm (left arm (BP Cuff), sitting)        Exams:     PHYSICAL EXAM:     GENERAL: vital signs recorded - well developed, well nourished,  moderately obese;  no apparent distress;     EYES: extraocular movements intact; conjunctiva and cornea are normal; PERRLA;     E/N/T:  normal EACs, TMs, nasal/oral mucosa, teeth, gingiva, and oropharynx;     NECK: range of motion is normal; thyroid is non-palpable;     RESPIRATORY: normal respiratory rate and pattern with no distress; normal breath sounds with no rales, rhonchi, wheezes or rubs;     CARDIOVASCULAR: normal rate; rhythm is regular;  no systolic murmur; no edema;     GASTROINTESTINAL: nontender; normal bowel sounds;     LYMPHATIC: no enlargement of cervical or facial nodes;     BREAST/INTEGUMENT: atypical mole(s) mole is 3-4 mm in size, located on the abdomen, and irregularly bordered;     MUSCULOSKELETAL: spine: shoulder height discrepancy (right higher than left);     NEUROLOGIC: GROSSLY INTACT     PSYCHIATRIC: appropriate affect and demeanor; normal psychomotor function;         Lab/Test Results:     AUDIO AND VISUAL SCREENING     Vision Testing: Near Right 20/30 Left 20/20 Bilateral 20/20;  Far Right 20/20 Left 20/20 Bilateral 20/20;  Color Vision: Pass; Audio Testing Full Audiogram 11727 Left 6000 Hz 10 dB 4000 Hz 10 dB 2000 Hz 10 dB 1000 Hz 10 dB 500 Hz 20 dB Right 6000 Hz 10 dB 4000 Hz 10 dB 2000 Hz 10 dB 1000 Hz 20 dB 500 Hz 35 dB Audiogram screening resulted in  all decibels less than 40.  Performed by:  tls         Procedures:     Screening for pulmonary tuberculosis     1. PPD placement: 0.1 cc unit dose given intradermally right forearm; administered by AW;  lot number [id number]; expires 5/25/2019         Diphtheria-tetanus-pertussis, combined [DTP] [DTaP]     1. given IM in the left upper arm; administered by AW;  lot number [id number]; expires 9/13/2020             ASSESSMENT           V70.0   Z00.01  Annual physical              DDx:     V74.1   Z11.1  Screening for pulmonary tuberculosis              DDx:     V06.1   Z23  Diphtheria-tetanus-pertussis, combined [DTP] [DTaP]              DDx:     238.2   D48.5  Atypical skin lesion              DDx:         ORDERS:         Radiology/Test Orders:       [id number]  Screening test of audiologic function, pure tone; air only  (In-House)           Lab Orders:       72386  PPD TB test  (In-House)           Procedures Ordered:       52942  Immunization administration; one vaccine  (In-House)         13095  Visual function screening, visual acuity, ocular alignment, color/field of vision & contrast  (In-House)         32532  Tdap vaccine, when administered to individuals 7 years or older, for intramuscular use  (In-House)         REFER  Referral to Specialist or Other Facility  (Send-Out)                   PLAN:          Annual physical discussed anxiety, sleep rx and stress, patient not interested in therapy; can try melatonin         COUNSELING was provided today regarding the following topics: healthy eating habits, regular exercise, use of seat belts, and use of motorcycle or bicycle helmets.      FOLLOW-UP: Schedule follow-up appointments on a p.r.n. basis.            Orders:       03091  Visual function screening, visual acuity, ocular alignment, color/field of vision & contrast  (In-House)         90619  Screening test of audiologic function, pure tone; air only  (In-House)            Screening for pulmonary tuberculosis follow up  after 48 hours for her skin test reading           Orders:       73069  PPD TB test  (In-House)            Diphtheria-tetanus-pertussis, combined [DTP] [DTaP] reviewed imm record she brought with her           Orders:       16340  Immunization administration; one vaccine  (In-House)         81904  Tdap vaccine, when administered to individuals 7 years or older, for intramuscular use  (In-House)            Atypical skin lesion several skin lesions, family history of skin cancer, will send to derm         REFERRALS:  Referral initiated to a dermatologist ( Dr. Mindi Cui ).            Orders:       REFER  Referral to Specialist or Other Facility  (Send-Out)               Patient Recommendations:        For  Annual physical:         Limit dietary intake of fat (especially saturated fat) and cholesterol.  Eat a variety of foods, including plenty of fruits, vegetables, and grain containg fiber, limit fat intake to 30% of total calories. Balance caloric intake with energy expended.    Maintaining regular physical activity is advised to help prevent heart disease, hypertension, diabetes, and obesity.    Always use shoulder/lap restraints when driving or riding in a vehicle, even those equipped with air bags.    Always wear approved headgear when riding motorcycles or bicycles.  Schedule follow-up appointments as needed.              CHARGE CAPTURE           **Please note: ICD descriptions below are intended for billing purposes only and may not represent clinical diagnoses**        Primary Diagnosis:         V70.0 Annual physical            Z00.01    Encounter for general adult medical exam w abnormal findings              Orders:          11367   Preventive medicine, new patient, age 18-39 years  (In-House)             18946   Visual function screening, visual acuity, ocular alignment, color/field of vision & contrast  (In-House)             97099   Screening test of audiologic function, pure tone; air only  (In-House)            V74.1 Screening for pulmonary tuberculosis            Z11.1    Encounter for screening for respiratory tuberculosis              Orders:          47375   PPD TB test  (In-House)           V06.1 Diphtheria-tetanus-pertussis, combined [DTP] [DTaP]            Z23    Encounter for immunization              Orders:          17636   Immunization administration; one vaccine  (In-House)             46107   Tdap vaccine, when administered to individuals 7 years or older, for intramuscular use  (In-House)           238.2 Atypical skin lesion            D48.5    Neoplasm of uncertain behavior of skin

## 2021-05-18 NOTE — PROGRESS NOTES
Brittany Vasquez 2000     Office/Outpatient Visit    Visit Date: Wed, Jun 5, 2019 09:50 am    Provider: Daisy Shields N.P. (Assistant: Celia Jordan MA)    Location: [facility]        Electronically signed by Daisy Shields N.P. on  06/05/2019 01:09:17 PM                             SUBJECTIVE:        CC:     Ms. Vasquez is a 19 year old White female.  presents today due to anal pain/itching         HPI:     Reports intermittent anal itching , has happened several times in the past but has happened x 2 this past month. Will start for no reason last for 3-4 days or so and then goes away on its own. Denies any HSV history or lesions . Does have chronic constipation and has Hard BM about 1 x a week.     ROS:     CONSTITUTIONAL:  Negative for chills, fatigue, fever, and weight change.      CARDIOVASCULAR:  Negative for chest pain, orthopnea, paroxysmal nocturnal dyspnea and pedal edema.      RESPIRATORY:  Negative for dyspnea.      GASTROINTESTINAL:  Positive for constipation ( chronic ) and possible hemorrhoids but has not seen any.   Negative for abdominal pain, diarrhea, melena, nausea or vomiting.      NEUROLOGICAL:  Negative for dizziness, headaches, paresthesias, and weakness.      PSYCHIATRIC:  Negative for anxiety, depression, and sleep disturbances.          PMH/FMH/SH:     Last Reviewed on 6/05/2019 01:06 PM by Daisy Shields    Past Medical History:                 PAST MEDICAL HISTORY     UNREMARKABLE     scoliosis/went to [facility]         GYNECOLOGICAL HISTORY:    G0    No problems with menstrual cycles.    Current method of contraception is nexplanon;     Menarche occurred at age 10. Sexually Active? No;         Surgical History:         Positive for bunionectomy x 2 11/13, and 01/14;         Family History:     Father: Healthy     Mother: Hypertension; Hyperlipidemia;  Skin Cancer     Brother(s): Healthy; 1 brother(s) total     Sister(s): Healthy; 1  sister(s) total     Paternal Grandfather:  at age 55; Cause of death was lung related     Paternal Grandmother: Medical history unknown     Maternal Grandfather:  at age 75; Cause of death was MI/CVA;  Skin Cancer     Maternal Grandmother: Arrhythmia ( pacemaker ); Coronary Artery Disease;  Skin Cancer;  cerebral aneurysm         Social History:     Parents' Marital Status:      Currently attends College. ( [place] [employer];  [employer]; field of study is radiology )     Employment: Works at [employer].          Tobacco/Alcohol/Supplements:     Last Reviewed on 2019 01:06 PM by Daisy Shields    Tobacco: She has never smoked.          Alcohol: Frequency:    weekly to monthly;             Current Problems:     Last Reviewed on 2019 01:06 PM by Daisy Shields    Nonspecific reaction to tuberculin skin test without active tuberculosis     High-risk sexual behavior     Scoliosis     Exercise induced bronchospasm     Acne     Acquired hallux valgus     Chondromalacia patellae     Anal pruritus     Screening for pulmonary tuberculosis         Immunizations:     DTaP 1998     DTaP 1998     DTaP 1999     DTaP 1998     DTaP 2002     Hib HbOC (4-dose) 1998     Hib HbOC (4-dose) 1999     Comvax (Hib-HepB) 1998     Comvax (Hib-HepB) 1998     Hep B (pedi/adol, 3-dose schedule) 1998     Menveo 4/15/2014     Menactra 2009     zzGardasil 2009     zzGardasil 2009     zzGardasil 2010     Hep A, adult dose 5/3/2018     OPV  Poliovirus, live (oral) 1998     OPV  Poliovirus, live (oral) 1998     OPV  Poliovirus, live (oral) 2002     IPV  Poliovirus, inactivated 1998     MMR  (Measles-Mumps-Rubella), live 2002     MMRV 3/8/1999     Varicella, live 3/8/1999     Varivax (Varicella) 2013     Prevnar (Pneumococcal PCV 7) 4/3/2000     Influenza Virus Vaccine, unspecified formulation 10/1/2018     PPD 7/3/2018      PPD 7/6/2018     Adacel (Tdap) 7/3/2018     Tdap (Tetanus, reduced diph, acellular pertussis) 7/14/2009         Allergies:     Last Reviewed on 6/05/2019 01:06 PM by Daisy Shields      No Known Drug Allergies.         Current Medications:     Last Reviewed on 6/05/2019 01:06 PM by Daisy Shields    Nexplanon 68mg Implant implanted by gyn         OBJECTIVE:        Vitals:         Current: 6/5/2019 9:53:08 AM    Ht:  5 ft, 4.5 in;  Wt: 126.8 lbs;  BMI: 21.4    T: 98.1 F (oral);  BP: 132/70 mm Hg (right arm, sitting);  P: 79 bpm (right arm (BP Cuff), sitting)        Exams:     PHYSICAL EXAM:     GENERAL: vital signs recorded - well developed, well nourished;  no apparent distress;     RESPIRATORY: normal respiratory rate and pattern with no distress; normal breath sounds with no rales, rhonchi, wheezes or rubs;     CARDIOVASCULAR: normal rate; rhythm is regular;  no systolic murmur; no edema;     GASTROINTESTINAL: nontender; normal bowel sounds; no organomegaly; rectal exam: normal tone; no hemorrhoids; anal erythema , no lesions;     NEUROLOGIC: GROSSLY INTACT     PSYCHIATRIC:  appropriate affect and demeanor; normal speech pattern; grossly normal memory;         ASSESSMENT:           698.0   L29.0  Anal pruritus              DDx:         ORDERS:         Meds Prescribed:       Hydrocortisone 0.5% Cream Apply thin film to affected area bid  as needed  #30 (Thirty) gm Refills: 0         Lab Orders:       [id number]  [facility] -[facility]- Herpes simplex IgG1&@ 51966 and 81867  (Send-Out)                   PLAN:          Anal pruritus Discussed HSV , Hemorrhoids and infections. Will check labs, denies hx of HSV and or lesions. Discussed importance of cleaning after BM, drinking lots of water, eating diet high in fiber and regular exercise. Will treat for possible internal hemorrhoids at present, await lab results. dmt     LABORATORY:  Labs ordered to be performed today include STD Testing: Herpes type I and II IgG index value  on each type.      FOLLOW-UP: Schedule follow-up appointments on a p.r.n. basis..            Prescriptions:       Hydrocortisone 0.5% Cream Apply thin film to affected area bid  as needed  #30 (Thirty) gm Refills: 0           Orders:       [id number]  [facility]VIG -[facility]- Herpes simplex IgG1&@ 04250 and 35206  (Send-Out)               Patient Recommendations:        For  Anal pruritus:     Schedule follow-up appointments as needed.                APPOINTMENT INFORMATION:        Monday Tuesday Wednesday Thursday Friday Saturday Sunday            Time:___________________AM  PM   Date:_____________________             CHARGE CAPTURE:           Primary Diagnosis:     698.0 Anal pruritus            L29.0    Pruritus ani              Orders:          23064   Office/outpatient visit; established patient, level 3  (In-House)

## 2021-05-18 NOTE — PROGRESS NOTES
Brittany Vasquez 2000     Office/Outpatient Visit    Visit Date:  03:20 pm    Provider: Cindy De Paz N.P. (Assistant: Cheri Norman MA)    Location: [facility]        Electronically signed by Cindy De Paz N.P. on  2019 12:22:43 PM                             SUBJECTIVE:        CC:     Ms. Vasquez is a 19 year old White female.  presents today due to complaints of vaginal discharge X 2 weeks         HPI:         Patient to be evaluated for vaginal discharge.  Pt states foul vaginal odor x 2 weeks. She has had BV in the past.      ROS:     CONSTITUTIONAL:  Negative for chills, fatigue, fever, and weight change.      EYES:  Negative for blurred vision.      CARDIOVASCULAR:  Negative for chest pain, orthopnea, paroxysmal nocturnal dyspnea and pedal edema.      RESPIRATORY:  Negative for dyspnea.      GASTROINTESTINAL:  Negative for abdominal pain, constipation, diarrhea, nausea and vomiting.      NEUROLOGICAL:  Negative for dizziness, headaches, paresthesias, and weakness.      PSYCHIATRIC:  Negative for anxiety, depression, and sleep disturbances.          PMH/FM/SH:     Last Reviewed on 2019 03:28 PM by Cindy De Paz    Past Medical History:                 PAST MEDICAL HISTORY     UNREMARKABLE     scoliosis/went to [facility]         GYNECOLOGICAL HISTORY:    G0    No problems with menstrual cycles.    Current method of contraception is nexplanon;     Menarche occurred at age 10. Sexually Active? No;         Surgical History:         Positive for bunionectomy x 2 , and ;         Family History:     Father: Healthy     Mother: Hypertension; Hyperlipidemia;  Skin Cancer     Brother(s): Healthy; 1 brother(s) total     Sister(s): Healthy; 1 sister(s) total     Paternal Grandfather:  at age 55; Cause of death was lung related     Paternal Grandmother: Medical history unknown     Maternal Grandfather:  at age 75; Cause of  death was MI/CVA;  Skin Cancer     Maternal Grandmother: Arrhythmia ( pacemaker ); Coronary Artery Disease;  Skin Cancer;  cerebral aneurysm         Social History:     Parents' Marital Status:      Currently attends College. ( [employer];  [employer]; field of study is radiology )     Employment: Works at [employer].          Tobacco/Alcohol/Supplements:     Last Reviewed on 7/30/2019 03:28 PM by Cindy De Paz    Tobacco: She has never smoked.          Alcohol: Frequency:    weekly to monthly;         Substance Abuse History:     Last Reviewed on 7/30/2019 03:28 PM by Cindy De Paz    None         Mental Health History:     Last Reviewed on 7/30/2019 03:28 PM by Cindy De Paz        Communicable Diseases (eg STDs):     Last Reviewed on 7/30/2019 03:28 PM by Cindy De Paz            Current Problems:     Last Reviewed on 7/30/2019 03:28 PM by Cindy De Paz    High-risk sexual behavior     Nonspecific reaction to tuberculin skin test without active tuberculosis     Scoliosis     Exercise induced bronchospasm     Acne     Acquired hallux valgus     Chondromalacia patellae     URI     Anal pruritus     Screening for pulmonary tuberculosis         Immunizations:     DTaP 1998     DTaP 1998     DTaP 6/24/1999     DTaP 1998     DTaP 1/28/2002     Hib HbOC (4-dose) 1998     Hib HbOC (4-dose) 6/24/1999     Comvax (Hib-HepB) 1998     Comvax (Hib-HepB) 1998     Hep B (pedi/adol, 3-dose schedule) 1998     Menveo 4/15/2014     Menactra 7/14/2009     zzGardasil 7/14/2009     zzGardasil 9/14/2009     zzGardasil 2/5/2010     Hep A, adult dose 5/3/2018     OPV  Poliovirus, live (oral) 1998     OPV  Poliovirus, live (oral) 1998     OPV  Poliovirus, live (oral) 1/28/2002     IPV  Poliovirus, inactivated 1998     MMR  (Measles-Mumps-Rubella), live 2/8/2002     MMRV 3/8/1999     Varicella, live 3/8/1999     Varivax (Varicella) 6/20/2013     Prevnar  (Pneumococcal PCV 7) 4/3/2000     Influenza Virus Vaccine, unspecified formulation 10/1/2018     PPD 7/3/2018     PPD 7/6/2018     Adacel (Tdap) 7/3/2018     Tdap (Tetanus, reduced diph, acellular pertussis) 7/14/2009         Allergies:     Last Reviewed on 7/30/2019 03:28 PM by Cindy De Paz      No Known Drug Allergies.         Current Medications:     Last Reviewed on 7/30/2019 03:28 PM by Cindy De Paz    Nexplanon 68mg Implant implanted by gyn     Terbinafine HCl 250mg Tablet Take 1 tablet(s) by mouth daily         OBJECTIVE:        Vitals:         Current: 7/30/2019 3:26:16 PM    Ht:  5 ft, 4.5 in;  Wt: 123.4 lbs;  BMI: 20.9    T: 98.4 F (oral);  BP: 120/74 mm Hg (right arm, sitting);  P: 74 bpm (right arm (BP Cuff), sitting)        Exams:     PHYSICAL EXAM:     GENERAL: vital signs recorded - well developed, well nourished;  no apparent distress;     EYES: extraocular movements intact; conjunctiva and cornea are normal; PERRLA;     NECK: range of motion is normal; thyroid is non-palpable;     RESPIRATORY: normal respiratory rate and pattern with no distress; normal breath sounds with no rales, rhonchi, wheezes or rubs;     CARDIOVASCULAR: normal rate; rhythm is regular;  no systolic murmur; no edema;     GENITOURINARY: vagina: yellow vaginal discharge; ; Vaginal sample obtained;     NEUROLOGICAL:  cranial nerves, motor and sensory function, reflexes, gait and coordination are all intact;     PSYCHIATRIC:  appropriate affect and demeanor; normal speech pattern; grossly normal memory;         ASSESSMENT:           616.10   N76.0  Vaginal discharge              DDx:         ORDERS:         Lab Orders:       [id number]  [facility]AGSC - [facility] VAG SCREEN CANDIDA,FELICITA, & TRICH 82078, [id number], 09788  (Send-Out)         13727  [facility] Genital Culture without GC  (Send-Out)                   PLAN:          Vaginal discharge     LABORATORY:  Labs ordered to be performed today include vaginal culture.            Orders:        [id number]  VAGSC - [facility] VAG SCREEN CANDIDA,FELICITA, & TRICH [id number], 29009, [id number]  (Send-Out)         24124  [facility] Genital Culture without GC  (Send-Out)               CHARGE CAPTURE:           Primary Diagnosis:     616.10 Vaginal discharge            N76.0    Acute vaginitis              Orders:          [id number]   Office/outpatient visit; established patient, level 3  (In-House)

## 2021-05-18 NOTE — PROGRESS NOTES
Brittany Vasquez 2000     Office/Outpatient Visit    Visit Date: Mon, Jul 22, 2019 11:34 am    Provider: Cindy De Paz N.P. (Assistant: Cheri Norman MA)    Location: [facility]        Electronically signed by Cindy De Paz N.P. on  07/22/2019 02:08:12 PM                             SUBJECTIVE:        CC:     Ms. Vasquez is a 19 year old White female.  presents today due to complaints of cough and sore throat X 4 days         HPI:         She complains of a sore throat.  Pt states sore throat x 4 days ago. States seeing white patches on her throat and got her worried. States having oral sex and then sex with a salty who is a previous boyfriend. States having encounters with him occassionally. She is worried that she contracted something from him.          High risk sexual behavior. See above sore throat note.          PHQ-9 Depression Screening: Completed form scanned and in chart; Total Score 8 Alcohol Consumption Screening: Completed form scanned and in chart; Total Score 4     ROS:     CONSTITUTIONAL:  Negative for chills, fatigue, fever, and weight change.      EYES:  Negative for blurred vision.      E/N/T:  Positive for sore throat.      CARDIOVASCULAR:  Negative for chest pain, orthopnea, paroxysmal nocturnal dyspnea and pedal edema.      RESPIRATORY:  Negative for dyspnea.      GASTROINTESTINAL:  Negative for abdominal pain, constipation, diarrhea, nausea and vomiting.      NEUROLOGICAL:  Negative for dizziness, headaches, paresthesias, and weakness.      PSYCHIATRIC:  Negative for anxiety, depression, and sleep disturbances.          PMH/FM/SH:     Last Reviewed on 7/22/2019 12:03 PM by Cindy De Paz    Past Medical History:                 PAST MEDICAL HISTORY     UNREMARKABLE     scoliosis/went to [facility]         GYNECOLOGICAL HISTORY:    G0    No problems with menstrual cycles.    Current method of contraception is nexplanon;     Menarche occurred  at age 10. Sexually Active? No;         Surgical History:         Positive for bunionectomy x 2 , and ;         Family History:     Father: Healthy     Mother: Hypertension; Hyperlipidemia;  Skin Cancer     Brother(s): Healthy; 1 brother(s) total     Sister(s): Healthy; 1 sister(s) total     Paternal Grandfather:  at age 55; Cause of death was lung related     Paternal Grandmother: Medical history unknown     Maternal Grandfather:  at age 75; Cause of death was MI/CVA;  Skin Cancer     Maternal Grandmother: Arrhythmia ( pacemaker ); Coronary Artery Disease;  Skin Cancer;  cerebral aneurysm         Social History:     Parents' Marital Status:      Currently attends College. ( [employer];  [employer]; field of study is radiology )     Employment: Works at [employer].          Tobacco/Alcohol/Supplements:     Last Reviewed on 2019 12:03 PM by Cindy De Paz    Tobacco: She has never smoked.          Alcohol: Frequency:    weekly to monthly;         Substance Abuse History:     Last Reviewed on 2019 12:03 PM by Cindy De Paz    None         Mental Health History:     Last Reviewed on 2019 12:03 PM by Cindy De Paz        Communicable Diseases (eg STDs):     Last Reviewed on 2019 12:03 PM by Cindy De Paz            Current Problems:     Last Reviewed on 2019 12:03 PM by Cindy De Paz    Nonspecific reaction to tuberculin skin test without active tuberculosis     High-risk sexual behavior     Scoliosis     Exercise induced bronchospasm     Acne     Acquired hallux valgus     Chondromalacia patellae     Sore throat     Screening for depression     Anal pruritus     Screening for pulmonary tuberculosis         Immunizations:     DTaP 1998     DTaP 1998     DTaP 1999     DTaP 1998     DTaP 2002     Hib HbOC (4-dose) 1998     Hib HbOC (4-dose) 1999     Comvax (Hib-HepB) 1998     Comvax (Hib-HepB) 1998      Hep B (pedi/adol, 3-dose schedule) 1998     Menveo 4/15/2014     Menactra 7/14/2009     zzFcodasil 7/14/2009     zzFcodasil 9/14/2009     zKyriedasil 2/5/2010     Hep A, adult dose 5/3/2018     OPV  Poliovirus, live (oral) 1998     OPV  Poliovirus, live (oral) 1998     OPV  Poliovirus, live (oral) 1/28/2002     IPV  Poliovirus, inactivated 1998     MMR  (Measles-Mumps-Rubella), live 2/8/2002     MMRV 3/8/1999     Varicella, live 3/8/1999     Varivax (Varicella) 6/20/2013     Prevnar (Pneumococcal PCV 7) 4/3/2000     Influenza Virus Vaccine, unspecified formulation 10/1/2018     PPD 7/3/2018     PPD 7/6/2018     Adacel (Tdap) 7/3/2018     Tdap (Tetanus, reduced diph, acellular pertussis) 7/14/2009         Allergies:     Last Reviewed on 7/22/2019 12:03 PM by Cindy De Paz      No Known Drug Allergies.         Current Medications:     Last Reviewed on 7/22/2019 12:03 PM by Cindy De Paz    Nexplanon 68mg Implant implanted by gyn     Terbinafine HCl 250mg Tablet Take 1 tablet(s) by mouth daily         OBJECTIVE:        Vitals:         Current: 7/22/2019 11:39:06 AM    Ht:  5 ft, 4.5 in;  Wt: 123.4 lbs;  BMI: 20.9    T: 98.6 F (oral);  BP: 129/84 mm Hg (right arm, sitting);  P: 95 bpm (right arm (BP Cuff), sitting)        Exams:     PHYSICAL EXAM:     GENERAL: vital signs recorded - well developed, well nourished;  no apparent distress;     EYES: extraocular movements intact; conjunctiva and cornea are normal; PERRLA;     E/N/T: OROPHARYNX: erythema, cobblestone;     NECK: range of motion is normal; thyroid is non-palpable;     RESPIRATORY: normal respiratory rate and pattern with no distress; normal breath sounds with no rales, rhonchi, wheezes or rubs;     CARDIOVASCULAR: normal rate; rhythm is regular;  no systolic murmur; no edema;     GASTROINTESTINAL: nontender; normal bowel sounds; no organomegaly;     NEUROLOGICAL:  cranial nerves, motor and sensory function, reflexes, gait and  coordination are all intact;     PSYCHIATRIC:  appropriate affect and demeanor; normal speech pattern; grossly normal memory;         Lab/Test Results:             Rapid Strep Screen:  Negative (07/22/2019),     Performed by::  karla (07/22/2019),             ASSESSMENT:           465.8   J06.9  URI              DDx:     V69.2   Z72.51  High-risk sexual behavior              DDx:     V79.0   Z13.31  Screening for depression              DDx:         ORDERS:         Lab Orders:       63614  Group A Streptococcus detection by immunoassay with direct optical observation  (In-House)         90360  [facility] Throat culture, strep  (Send-Out)         12664  AHEP4 - [facility] Hepatitis Panel (HAAb, HbcAB, HbsAG, Hep C antibody)  (Send-Out)         46560  HIV - [facility] HIV-1 and HIV-2 Ab  (Send-Out)         81733  RPR - [facility] RPR, Rfx Qn RPR/Confirm TP  (Send-Out)         20979  CTNGX- H Chlamydia GC swab or urine (16211, 94146)  (Send-Out)         00134  HSVIG -[facility]- Herpes simplex IgG1&@ 46115 and 52840  (Send-Out)         31386  VAGSC - [facility] VAG SCREEN CANDIDA,FELICITA, & TRICH 09145  (Send-Out)         37996  BDUTR - [facility] Urine Trichonomas  (Send-Out)                   PLAN:          URI recommend allegra.           Orders:       75629  Group A Streptococcus detection by immunoassay with direct optical observation  (In-House)         [facility]  [facility]rep  ([facility])            High-risk sexual behavior     LABORATORY:  Labs ordered to be performed today include STD Testing: Hepatitis panel HIV I and HIV 2 Ab RPR Chlamydia/GC [facility] Herpes type I and II IgG index value on each type Vaginal Screen Urine Trichomonas.            Orders:       12362  AHEP4 - [facility] Hepatitis Panel (HAAb, HbcAB, HbsAG, Hep C antibody)  (Send-Out)         66164  HIV - H HIV-1 and HIV-2 Ab  (Send-Out)         72017  RPR - [facility] RPR, Rfx Qn RPR/Confirm TP  (Send-Out)         63635  CTNGX- H Chlamydia GC swab or urine (18058, 65651)  (Send-Out)          50539  HSVIG -[place]- Herpes simplex IgG1&@ 89835 and 26549  (Send-Out)         33338  VAGSC - [place] VAG SCREEN CANDIDA,FELICITA, & TRICH 18116  (Send-Out)         07204  BDUTR - [place] Urine Trichonomas  (Send-Out)               Other Orders:       29899  Office/outpatient visit; established patient, level 3  (In-House)           CHARGE CAPTURE:           Primary Diagnosis:     465.8 URI            J06.9    Acute upper respiratory infection, unspecified              Orders:          14221   Group A Streptococcus detection by immunoassay with direct optical observation  (In-House)           V69.2 High-risk sexual behavior            Z72.51    High risk heterosexual behavior    V79.0 Screening for depression            Z13.31    Encounter for screening for depression        Other Orders:           83124   Office/outpatient visit; established patient, level 3  (In-House)           ADDENDUMS:      ____________________________________    Date: 07/25/2019 06:59 PM    Author: Cindy De Paz [id number]

## 2021-07-01 VITALS
DIASTOLIC BLOOD PRESSURE: 75 MMHG | TEMPERATURE: 98.1 F | HEART RATE: 86 BPM | BODY MASS INDEX: 20.62 KG/M2 | HEIGHT: 65 IN | WEIGHT: 123.8 LBS | SYSTOLIC BLOOD PRESSURE: 130 MMHG

## 2021-07-01 VITALS
BODY MASS INDEX: 20.56 KG/M2 | WEIGHT: 123.4 LBS | HEIGHT: 65 IN | TEMPERATURE: 98.6 F | HEART RATE: 95 BPM | DIASTOLIC BLOOD PRESSURE: 84 MMHG | SYSTOLIC BLOOD PRESSURE: 129 MMHG

## 2021-07-01 VITALS
WEIGHT: 123.4 LBS | HEART RATE: 74 BPM | DIASTOLIC BLOOD PRESSURE: 74 MMHG | TEMPERATURE: 98.4 F | BODY MASS INDEX: 20.56 KG/M2 | SYSTOLIC BLOOD PRESSURE: 120 MMHG | HEIGHT: 65 IN

## 2021-07-01 VITALS
SYSTOLIC BLOOD PRESSURE: 122 MMHG | HEART RATE: 68 BPM | HEIGHT: 65 IN | TEMPERATURE: 98.2 F | WEIGHT: 123 LBS | BODY MASS INDEX: 20.49 KG/M2 | DIASTOLIC BLOOD PRESSURE: 75 MMHG

## 2021-07-01 VITALS
HEIGHT: 65 IN | HEART RATE: 79 BPM | BODY MASS INDEX: 21.13 KG/M2 | WEIGHT: 126.8 LBS | SYSTOLIC BLOOD PRESSURE: 132 MMHG | DIASTOLIC BLOOD PRESSURE: 70 MMHG | TEMPERATURE: 98.1 F

## 2021-07-01 VITALS
WEIGHT: 126.1 LBS | HEART RATE: 74 BPM | BODY MASS INDEX: 21.01 KG/M2 | TEMPERATURE: 98.8 F | HEIGHT: 65 IN | SYSTOLIC BLOOD PRESSURE: 127 MMHG | DIASTOLIC BLOOD PRESSURE: 82 MMHG

## 2021-07-02 VITALS
DIASTOLIC BLOOD PRESSURE: 74 MMHG | BODY MASS INDEX: 20.56 KG/M2 | WEIGHT: 123.4 LBS | HEART RATE: 85 BPM | TEMPERATURE: 97.3 F | SYSTOLIC BLOOD PRESSURE: 116 MMHG | HEIGHT: 65 IN

## 2021-07-02 VITALS
BODY MASS INDEX: 21.06 KG/M2 | HEIGHT: 65 IN | SYSTOLIC BLOOD PRESSURE: 116 MMHG | DIASTOLIC BLOOD PRESSURE: 79 MMHG | WEIGHT: 126.4 LBS | HEART RATE: 77 BPM | TEMPERATURE: 97.8 F

## 2021-11-10 ENCOUNTER — OFFICE VISIT (OUTPATIENT)
Dept: OBSTETRICS AND GYNECOLOGY | Facility: CLINIC | Age: 21
End: 2021-11-10

## 2021-11-10 VITALS
SYSTOLIC BLOOD PRESSURE: 118 MMHG | DIASTOLIC BLOOD PRESSURE: 70 MMHG | HEIGHT: 65 IN | WEIGHT: 115 LBS | BODY MASS INDEX: 19.16 KG/M2

## 2021-11-10 DIAGNOSIS — K64.9 HEMORRHOIDS, UNSPECIFIED HEMORRHOID TYPE: ICD-10-CM

## 2021-11-10 DIAGNOSIS — K59.00 CONSTIPATION, UNSPECIFIED CONSTIPATION TYPE: ICD-10-CM

## 2021-11-10 DIAGNOSIS — B37.9 YEAST INFECTION: ICD-10-CM

## 2021-11-10 DIAGNOSIS — Z11.3 SCREENING FOR STDS (SEXUALLY TRANSMITTED DISEASES): Primary | ICD-10-CM

## 2021-11-10 DIAGNOSIS — N89.8 VAGINAL ITCHING: ICD-10-CM

## 2021-11-10 LAB
KOH PREP NAIL: ABNORMAL
WET PREP GENITAL: NORMAL

## 2021-11-10 PROCEDURE — 99203 OFFICE O/P NEW LOW 30 MIN: CPT | Performed by: NURSE PRACTITIONER

## 2021-11-10 PROCEDURE — 87210 SMEAR WET MOUNT SALINE/INK: CPT | Performed by: NURSE PRACTITIONER

## 2021-11-10 PROCEDURE — 87220 TISSUE EXAM FOR FUNGI: CPT | Performed by: NURSE PRACTITIONER

## 2021-11-10 RX ORDER — HYDROCORTISONE ACETATE 25 MG/1
25 SUPPOSITORY RECTAL 2 TIMES DAILY PRN
Qty: 20 SUPPOSITORY | Refills: 0 | Status: SHIPPED | OUTPATIENT
Start: 2021-11-10 | End: 2021-11-20

## 2021-11-10 RX ORDER — FLUCONAZOLE 150 MG/1
TABLET ORAL
Qty: 2 TABLET | Refills: 0 | Status: SHIPPED | OUTPATIENT
Start: 2021-11-10 | End: 2022-01-20

## 2021-11-10 NOTE — PROGRESS NOTES
"Chief Complaint   Patient presents with   • Vaginitis         Subjective   HPI  Brittany Vasquez is a 21 y.o. female, , who presents with evaluation of vaginal itching, irriatation and tenderness with wiping and redness in the area.    She reports that she had a yeast infection in August, that she was treated for. She also reports that she was given an antibiotic for a possible UTI at time as well.  Symptoms resolved after treatment, but then later returned.     She also reports constipation and internal hemorrhoids.     Her last LMP was Patient's last menstrual period was 10/16/2021 (exact date)..  Periods are regular every 25-35 days, lasting 4-5 days.  Partner Status: Marital Status: single.  New Partners since last visit: yes.  Desires STD Screening: yes.    Additional OB/GYN History   Last Pap : Approx 8312-6414- normal   Last Completed Pap Smear     This patient has no relevant Health Maintenance data.        History of abnormal Pap smear: no  OB History        0    Para   0    Term   0       0    AB   0    Living   0       SAB   0    IAB   0    Ectopic   0    Molar   0    Multiple   0    Live Births   0                The additional following portions of the patient's history were reviewed and updated as appropriate: allergies, current medications, past family history, past medical history, past social history, past surgical history and problem list.    Review of Systems   Gastrointestinal: Positive for constipation and rectal pain (hemorrhoids).   Genitourinary: Positive for dyspareunia.        Vulvar and vaginal itching, PCB   All other systems reviewed and are negative.    All other systems reviewed and are negative.     I have reviewed and agree with the HPI, ROS, and historical information as entered above. Magnolia Pittman, APRN    Objective   /70   Ht 165.1 cm (65\")   Wt 52.2 kg (115 lb)   LMP 10/16/2021 (Exact Date)   Breastfeeding No   BMI 19.14 kg/m²     Physical " Exam  Vitals and nursing note reviewed. Exam conducted with a chaperone present.   Constitutional:       Appearance: Normal appearance.   HENT:      Head: Normocephalic and atraumatic.   Pulmonary:      Effort: Pulmonary effort is normal.   Abdominal:      General: Abdomen is flat.   Genitourinary:     Labia:         Right: No rash, tenderness or lesion.         Left: No rash, tenderness or lesion.       Vagina: Vaginal discharge (small amt, white discharge) present. No lesions.      Cervix: No cervical motion tenderness, discharge, lesion or cervical bleeding.      Uterus: Normal. Not enlarged, not fixed and not tender.       Adnexa:         Right: No mass or tenderness.          Left: No mass or tenderness.        Rectum: No external hemorrhoid.      Comments: Chaperone Present  Neurological:      Mental Status: She is alert and oriented to person, place, and time.   Psychiatric:         Behavior: Behavior normal.         Wet mount performed? Yes. Pertinent positives include: Yeast Buds    Assessment/Plan     Assessment and Plan    Problem List Items Addressed This Visit     None      Visit Diagnoses     Screening for STDs (sexually transmitted diseases)    -  Primary    Relevant Orders    Chlamydia trachomatis, Neisseria gonorrhoeae, Trichomonas vaginalis, PCR - Swab, Cervix    Vaginal itching        Relevant Orders    Chlamydia trachomatis, Neisseria gonorrhoeae, Trichomonas vaginalis, PCR - Swab, Cervix    POC Wet Prep (Completed)    POC KOH Prep (Completed)    Yeast infection        Relevant Medications    fluconazole (Diflucan) 150 MG tablet    Constipation, unspecified constipation type        Hemorrhoids, unspecified hemorrhoid type              1. Wet prep +yeast, Rx diflucan. Discussed vulvovaginal hygiene. STD screening sent. Will need U/S to eval [facility] once yeast resolved. PVU.   2. Discussed treatment of constipation, and given anusol supp for hemorrhoids. If constipation not resolved with miralax,  increased water, fiber, exercise, will need referral to GI.   3. Schedule annual.         Magnolia Pittman, MARAH  11/10/2021

## 2021-11-12 LAB
C TRACH RRNA SPEC QL NAA+PROBE: NEGATIVE
N GONORRHOEA RRNA SPEC QL NAA+PROBE: NEGATIVE
T VAGINALIS DNA SPEC QL NAA+PROBE: NEGATIVE

## 2022-01-20 ENCOUNTER — TELEMEDICINE (OUTPATIENT)
Dept: FAMILY MEDICINE CLINIC | Facility: TELEHEALTH | Age: 22
End: 2022-01-20

## 2022-01-20 ENCOUNTER — LAB (OUTPATIENT)
Dept: LAB | Facility: HOSPITAL | Age: 22
End: 2022-01-20

## 2022-01-20 DIAGNOSIS — B34.9 VIRAL ILLNESS: Primary | ICD-10-CM

## 2022-01-20 LAB — SARS-COV-2 RNA NOSE QL NAA+PROBE: NOT DETECTED

## 2022-01-20 PROCEDURE — 99213 OFFICE O/P EST LOW 20 MIN: CPT | Performed by: NURSE PRACTITIONER

## 2022-01-20 PROCEDURE — U0004 COV-19 TEST NON-CDC HGH THRU: HCPCS | Performed by: NURSE PRACTITIONER

## 2022-01-20 NOTE — PATIENT INSTRUCTIONS
Viral Illness, Adult  Viruses are tiny germs that can get into a person's body and cause illness. There are many different types of viruses, and they cause many types of illness. Viral illnesses can range from mild to severe. They can affect various parts of the body.  Short-term conditions that are caused by a virus include colds and the flu (influenza). Long-term conditions that are caused by a virus include herpes, shingles, and HIV (human immunodeficiency virus) infection. A few viruses have been linked to certain cancers.  What are the causes?  Many types of viruses can cause illness. Viruses invade cells in your body, multiply, and cause the infected cells to work abnormally or die. When these cells die, they release more of the virus. When this happens, you develop symptoms of the illness, and the virus continues to spread to other cells. If the virus takes over the function of the cell, it can cause the cell to divide and grow out of control. This happens when a virus causes cancer.  Different viruses get into the body in different ways. You can get a virus by:  · Swallowing food or water that has come in contact with the virus (is contaminated).  · Breathing in droplets that have been coughed or sneezed into the air by an infected person.  · Touching a surface that has been contaminated with the virus and then touching your eyes, nose, or mouth.  · Being bitten by an insect or animal that carries the virus.  · Having sexual contact with a person who is infected with the virus.  · Being exposed to blood or fluids that contain the virus, either through an open cut or during a transfusion.  If a virus enters your body, your body's defense system (immune system) will try to fight the virus. You may be at higher risk for a viral illness if your immune system is weak.  What are the signs or symptoms?  You may have these symptoms, depending on the type of virus and the location of the cells that it  invades:  · Cold and flu viruses:  ? Fever.  ? Headache.  ? Sore throat.  ? Muscle aches.  ? Stuffy nose (nasal congestion).  ? Cough.  · Digestive system (gastrointestinal) viruses:  ? Fever.  ? Pain in the abdomen.  ? Nausea.  ? Diarrhea.  · Liver viruses (hepatitis):  ? Loss of appetite.  ? Tiredness.  ? Skin or the white parts of your eyes turning yellow (jaundice).  · Brain and spinal cord viruses:  ? Fever.  ? Headache.  ? Stiff neck.  ? Nausea and vomiting.  ? Confusion or sleepiness.  · Skin viruses:  ? Warts.  ? Itching.  ? Rash.  · Sexually transmitted viruses:  ? Discharge.  ? Swelling.  ? Redness.  ? Rash.  How is this diagnosed?  This condition may be diagnosed based on one or more of the following:  · Symptoms.  · Medical history.  · Physical exam.  · Blood test, sample of mucus from your lungs (sputum sample), stool sample, or a swab of body fluids or a skin sore (lesion).  How is this treated?  Viruses can be hard to treat because they live within cells. Antibiotic medicines do not treat viruses because these medicines do not get inside cells. Treatment for a viral illness may include:  · Resting and drinking plenty of fluids.  · Medicines to relieve symptoms. These can include over-the-counter medicine for pain and fever, medicines for cough or congestion, and medicines to relieve diarrhea.  · Antiviral medicines. These medicines are available only for certain types of viruses.  Some viral illnesses can be prevented with vaccinations. A common example is the flu shot.  Follow these instructions at home:  Medicines  · Take over-the-counter and prescription medicines only as told by your health care provider.  · If you were prescribed an antiviral medicine, take it as told by your health care provider. Do not stop taking the antiviral even if you start to feel better.  · Be aware of when antibiotics are needed and when they are not needed. Antibiotics do not treat viruses. You may get an antibiotic if  your health care provider thinks that you may have, or are at risk for, a bacterial infection and you have a viral infection.  ? Do not ask for an antibiotic prescription if you have been diagnosed with a viral illness. Antibiotics will not make your illness go away faster.  ? Frequently taking antibiotics when they are not needed can lead to antibiotic resistance. When this develops, the medicine no longer works against the bacteria that it normally fights.  General instructions    · Drink enough fluids to keep your urine pale yellow.  · Rest as much as possible.  · Return to your normal activities as told by your health care provider. Ask your health care provider what activities are safe for you.  · Keep all follow-up visits as told by your health care provider. This is important.    How is this prevented?  To reduce your risk of viral illness:  · Wash your hands often with soap and water for at least 20 seconds. If soap and water are not available, use hand .  · Avoid touching your nose, eyes, and mouth, especially if you have not washed your hands recently.  · If anyone in your household has a viral infection, clean all household surfaces that may have been in contact with the virus. Use soap and hot water. You may also use bleach that you have added water to (diluted).  · Stay away from people who are sick with symptoms of a viral infection.  · Do not share items such as toothbrushes and water bottles with other people.  · Keep your vaccinations up to date. This includes getting a yearly flu shot.  · Eat a healthy diet and get plenty of rest.  Contact a health care provider if:  · You have symptoms of a viral illness that do not go away.  · Your symptoms come back after going away.  · Your symptoms get worse.  Get help right away if you have:  · Trouble breathing.  · A severe headache or a stiff neck.  · Severe vomiting or pain in your abdomen.  These symptoms may represent a serious problem that is  an emergency. Do not wait to see if the symptoms will go away. Get medical help right away. Call your local emergency services (911 in the [place] Do not drive yourself to the hospital.  Summary  · Viruses are types of germs that can get into a person's body and cause illness. Viral illnesses can range from mild to severe. They can affect various parts of the body.  · Viruses can be hard to treat. There are medicines to relieve symptoms, and there are some antiviral medicines.  · If you were prescribed an antiviral medicine, take it as told by your health care provider. Do not stop taking the antiviral even if you start to feel better.  · Contact a health care provider if you have symptoms of a viral illness that do not go away.  This information is not intended to replace advice given to you by your health care provider. Make sure you discuss any questions you have with your health care provider.  Document Revised: 05/03/2021 Document Reviewed: 10/27/2020  [employer] Patient Education © 2021 [employer]

## 2022-01-20 NOTE — PROGRESS NOTES
HPI  Brittany Vasquez is a 22 y.o. female  presents with complaint of headache, sore throat, body aches that started 1/19. Needs covid test for [employer] employee health.     Denies known fever, chills, sweats, SOA, CP, congestion, cough, N/V/D.    Has had covid exposures at work.   Roommate had flu last week but she left to isolate from him.     Has not taken any meds yet.     Review of Systems    No past medical history on file.    Family History   Problem Relation Age of Onset   • Hypertension Mother    • Osteoporosis Mother    • Hypertension Maternal Grandmother    • Heart disease Maternal Grandfather    • Hypertension Maternal Aunt    • Breast cancer Maternal Aunt    • Hypertension Maternal Aunt    • Colon cancer Maternal Uncle    • Lymphoma Maternal Uncle         non hodgkins   • Parkinsonism Maternal Uncle    • Depression Maternal Uncle        Social History     Socioeconomic History   • Marital status: Single   Tobacco Use   • Smoking status: Never Smoker   • Smokeless tobacco: Never Used   Substance and Sexual Activity   • Alcohol use: Yes     Comment: Occasional    • Drug use: Never   • Sexual activity: Yes     Birth control/protection: None         There were no vitals taken for this visit.    PHYSICAL EXAM  Physical Exam   Constitutional: She appears well-developed and well-nourished.   HENT:   Head: Normocephalic.   Nose: Nose normal.   Neck: Neck normal appearance.  Pulmonary/Chest: Effort normal.   Neurological: She is alert.   Psychiatric: She has a normal mood and affect. Her speech is normal.       Diagnoses and all orders for this visit:    1. Viral illness (Primary)  -     COVID-19 PCR, [facility], NP SWAB IN [facility]AL TRANSPORT MEDIA/ORAL SWISH 24-30 HR TAT - Swab, Nasopharynx; Future          FOLLOW-UP  As discussed during visit with [facility], if symptoms worsen or fail to improve, follow-up with PCP/Urgent Care/Emergency Department.    Patient verbalizes understanding of medications,  instructions for treatment and follow-up.    MARAH Fallon  01/20/2022  12:06 EST    This visit was performed via Telehealth.  This patient has been instructed to follow-up with their primary care provider if their symptoms worsen or the treatment provided does not resolve their illness.    Brittany MAGANA Christina verbally consented to a telehealth visit. Brittany H Christina was seen via telehealth using real-time video conferencing technology by MARAH Fallon. Brittany Vasquez was located at [place], [place], and MARAH Fallon was located in [place], [place].

## 2022-07-28 ENCOUNTER — TELEPHONE (OUTPATIENT)
Dept: OBSTETRICS AND GYNECOLOGY | Facility: CLINIC | Age: 22
End: 2022-07-28

## 2022-07-28 NOTE — TELEPHONE ENCOUNTER
Pt requests OCPs.  Hx of OCP use with good results.  Reviewed risks, benefits, side effects, correct use.  Instructions given.  Balcoltra samples given x 2.  Let me know how it's going in a few packs.    Pt is US tech in office.

## 2022-11-14 RX ORDER — AZITHROMYCIN 250 MG/1
TABLET, FILM COATED ORAL
Qty: 6 TABLET | Refills: 0 | Status: SHIPPED | OUTPATIENT
Start: 2022-11-14 | End: 2022-11-19

## 2022-11-22 RX ORDER — FLUCONAZOLE 150 MG/1
TABLET ORAL
Qty: 2 TABLET | Refills: 0 | Status: SHIPPED | OUTPATIENT
Start: 2022-11-22 | End: 2022-12-15 | Stop reason: SDUPTHER

## 2022-12-15 ENCOUNTER — OFFICE VISIT (OUTPATIENT)
Dept: OBSTETRICS AND GYNECOLOGY | Facility: CLINIC | Age: 22
End: 2022-12-15

## 2022-12-15 DIAGNOSIS — Z11.3 SCREENING FOR STD (SEXUALLY TRANSMITTED DISEASE): ICD-10-CM

## 2022-12-15 DIAGNOSIS — B37.31 YEAST VAGINITIS: ICD-10-CM

## 2022-12-15 DIAGNOSIS — N76.0 BV (BACTERIAL VAGINOSIS): Primary | ICD-10-CM

## 2022-12-15 DIAGNOSIS — B96.89 BV (BACTERIAL VAGINOSIS): Primary | ICD-10-CM

## 2022-12-15 DIAGNOSIS — N76.0 VULVOVAGINITIS: ICD-10-CM

## 2022-12-15 LAB — WET PREP GENITAL: NORMAL

## 2022-12-15 PROCEDURE — 87210 SMEAR WET MOUNT SALINE/INK: CPT | Performed by: NURSE PRACTITIONER

## 2022-12-15 PROCEDURE — 99213 OFFICE O/P EST LOW 20 MIN: CPT | Performed by: NURSE PRACTITIONER

## 2022-12-15 NOTE — PROGRESS NOTES
CC:  Irritation, odor      Subjective   HPI  Brittany Vasquez is a 22 y.o. female, , who presents for evaluation of local irritation and odor. The discharge is watery with a mild abnormal odor.  She took 2 Diflucan 2 wks ago with no improvement of symptoms.    Sexual History    She is currently sexually active.  She would like to be screened for STD's at today's exam, also BV and yeast.    Current birth control method: OCPs.  LMP 2 wks ago.        OB History        0    Para   0    Term   0       0    AB   0    Living   0       SAB   0    IAB   0    Ectopic   0    Molar   0    Multiple   0    Live Births   0                The additional following portions of the patient's history were reviewed and updated as appropriate: allergies, current medications, past family history, past medical history, past social history, past surgical history and problem list.    Review of Systems  All other systems reviewed and are negative.     I have reviewed and agree with the HPI, ROS, and historical information as entered above. MARAH De La Fuente    Objective   There were no vitals taken for this visit.    Physical Exam  Vitals and nursing note reviewed. Exam conducted with a chaperone present.   Constitutional:       General: She is not in acute distress.     Appearance: Normal appearance. She is normal weight. She is not ill-appearing.   Pulmonary:      Effort: Pulmonary effort is normal. No respiratory distress.   Abdominal:      General: There is no distension.      Palpations: Abdomen is soft. There is no mass.      Tenderness: There is no abdominal tenderness. There is no guarding or rebound.      Hernia: No hernia is present.   Genitourinary:     General: Normal vulva.      Labia:         Right: No rash, tenderness, lesion or injury.         Left: No rash, tenderness, lesion or injury.       Comments: Mild external redness  Lymphadenopathy:      Lower Body: No right inguinal adenopathy.  No left inguinal adenopathy.   Skin:     General: Skin is warm and dry.   Neurological:      Mental Status: She is alert and oriented to person, place, and time.   Psychiatric:         Mood and Affect: Mood normal.         Behavior: Behavior normal.         Wet mount performed? Yes.  Clue cells and few yeast buds    Assessment & Plan     Assessment and Plan    Problem List Items Addressed This Visit    None  Visit Diagnoses     BV (bacterial vaginosis)    -  Primary    Screening for STD (sexually transmitted disease)        Yeast vaginitis        Vulvovaginitis                D/w pt findings c/w BV and yeast.  Rev Rx for each.  Will notify of STD, BV, and yeast testing.  RTO if symptoms continue or new symptoms appear.    Cami Parekh, APRN  12/15/2022

## 2022-12-19 LAB
A VAGINAE DNA VAG QL NAA+PROBE: NORMAL SCORE
BVAB2 DNA VAG QL NAA+PROBE: NORMAL SCORE
C ALBICANS DNA VAG QL NAA+PROBE: NEGATIVE
C GLABRATA DNA VAG QL NAA+PROBE: NEGATIVE
C TRACH DNA VAG QL NAA+PROBE: NEGATIVE
MEGA1 DNA VAG QL NAA+PROBE: NORMAL SCORE
N GONORRHOEA DNA VAG QL NAA+PROBE: NEGATIVE
T VAGINALIS DNA VAG QL NAA+PROBE: NEGATIVE

## 2022-12-23 RX ORDER — NORETHINDRONE ACETATE AND ETHINYL ESTRADIOL 1MG-20(21)
1 KIT ORAL DAILY
Qty: 84 TABLET | Refills: 3 | Status: SHIPPED | OUTPATIENT
Start: 2022-12-23 | End: 2023-12-23

## 2023-03-16 RX ORDER — NORETHINDRONE ACETATE AND ETHINYL ESTRADIOL 1MG-20(21)
1 KIT ORAL DAILY
Qty: 84 TABLET | Refills: 3 | OUTPATIENT
Start: 2023-03-16 | End: 2024-03-15

## 2023-05-02 RX ORDER — FLUCONAZOLE 150 MG/1
TABLET ORAL
Qty: 2 TABLET | Refills: 0 | Status: SHIPPED | OUTPATIENT
Start: 2023-05-02